# Patient Record
Sex: FEMALE | Employment: FULL TIME | URBAN - METROPOLITAN AREA
[De-identification: names, ages, dates, MRNs, and addresses within clinical notes are randomized per-mention and may not be internally consistent; named-entity substitution may affect disease eponyms.]

---

## 2022-05-16 ENCOUNTER — HOSPITAL ENCOUNTER (EMERGENCY)
Facility: CLINIC | Age: 37
Discharge: HOME | End: 2022-05-17
Attending: EMERGENCY MEDICINE | Admitting: EMERGENCY MEDICINE

## 2022-05-16 DIAGNOSIS — F10.920 ALCOHOLIC INTOXICATION WITHOUT COMPLICATION (H): ICD-10-CM

## 2022-05-16 DIAGNOSIS — R45.851 SUICIDAL IDEATION: ICD-10-CM

## 2022-05-16 PROCEDURE — 99285 EMERGENCY DEPT VISIT HI MDM: CPT | Mod: 25

## 2022-05-16 PROCEDURE — 90791 PSYCH DIAGNOSTIC EVALUATION: CPT

## 2022-05-17 VITALS
RESPIRATION RATE: 16 BRPM | SYSTOLIC BLOOD PRESSURE: 109 MMHG | OXYGEN SATURATION: 98 % | HEART RATE: 91 BPM | DIASTOLIC BLOOD PRESSURE: 78 MMHG | TEMPERATURE: 98.6 F

## 2022-05-17 PROCEDURE — 250N000013 HC RX MED GY IP 250 OP 250 PS 637: Performed by: EMERGENCY MEDICINE

## 2022-05-17 RX ORDER — ACETAMINOPHEN 325 MG/1
650 TABLET ORAL ONCE
Status: COMPLETED | OUTPATIENT
Start: 2022-05-17 | End: 2022-05-17

## 2022-05-17 RX ADMIN — ACETAMINOPHEN 650 MG: 325 TABLET, FILM COATED ORAL at 01:30

## 2022-05-17 NOTE — ED NOTES
Pt resting in bed. Brought patient water and breakfast. Pt remains calm, cooperative. Call light within reach.
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
## 2022-05-17 NOTE — ED NOTES
Signed out to me patient signed out to me by Dr. Killian.  Please see us or provider note for full details.  Patient arrived via EMS on LONNIE and intoxicated.  Reportedly wrote a suicide letter.  DEC initially tried to evaluate the patient was too intoxicated.  Plan is for sober reassessment.    Patient reassessed in the morning.  She is lucid and able to ambulate without any assistance.  No slurring of speech.  Patient is clinically sober.  She admits to drinking alcohol last night denies any other drug use.  She reports having previous suicide attempt in remote past but is no longer suicidal.  She denies any hallucinations or homicidal thoughts.  Patient notified that DEC will be evaluating her and she understands and agrees.    I spoke with DEC who felt patient was safe to discharge and I agree.  Patient able to contract for safety.  Outpatient resources provided.  All questions answered prior to discharge.      Alexander Hurst MD  Emergency Medicine      Natali, Alexander Hernández MD  05/17/22 9611
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
## 2022-05-17 NOTE — ED NOTES
Patient is cleard for discharge home. Safety plan reviewed with patient. Patient able to contract for safety. Patient verbalized understanding of discharge instructions including follow up appointments. All belongings returned to patient.

## 2022-05-17 NOTE — DISCHARGE INSTRUCTIONS
As discussed, we have scheduled you for therapy and psychiatry. Below are the appointment details:     Teletherapy: 5/22/2022 6:00 pm  Alexa Krishnamurthy MA  FT  [address] [address], [address] [phone]     Psychiatry: 6/9/2022 3:00 pm  Charlette Ireland  MS  [place], [place]  [address] [facility] , [address]  [phone]    Aftercare Plan  If I am feeling unsafe or I am in a crisis, I will:   Contact my established care providers   Call the [place] Suicide Prevention Lifeline: [phone]   Go to the nearest emergency room   Call [phone]     Warning signs that I or other people might notice when a crisis is developing for me:   Thoughts to harm myself and urges/plans to act on those thoughts    Things I am able to do on my own or with others to cope or help me feel better:   Journal  Talk and spend time with friends     Things I can use or do for distraction:   Try TIPP! It is a set of techniques that can help when you feel overwhelmed with an emotion. Practice these techniques so you know what they feel like and then use them as needed.     T: Temperature  Cooler temperatures decrease your heart rate (which is usually faster when we are emotionally overwhelmed). You can either splash your face with cold water, take a cold (but not too cold) shower, or if the weather outside is chilly you can go outside for a walk. Another idea is to take an ice cube and hold it in your hand or rub your face with it.    Higher temperatures increase your heart rate (which is usually lower when you feel depressed, sad, or anxious). You can take a hot bath, nestle up in a blanket, go outside on a hot day, or drinking a warm tea.    I: Intense Exercise  When you have a built-up energy as a result of experiencing overwhelming emotions, it can be a really good idea to spend this energy by doing a cardio work-out. It doesn't have to be anything fancy - you don't need special equipment or expensive membership in a  gym. Simply get on your feet and do one of the following: go for a run around the block, do jumping jacks in your room, go outside and walk fast. You can also try jumping rope, dancing or lifting weights. Do this for 10-15 minutes but don't overdo it. When you spend that conserved energy you will feel more tired and your overwhelming emotions will become more balanced.    P: Paced Breathing  In order to reduce the physical manifestation of the overwhelming emotions you feel (for e.g. increased heart rate, flushed face, dry mouth, sweating etc.) it helps to try to control your breathing so that its rate will eventually decrease. Try the following technique: breathe in deeply through your nose (abdominal breathing) for four seconds and then breathe out through your mouth (for six seconds). Do this for 1-2 minutes.    P: Progressive Muscle Relaxation  In order to relax the tense muscles in our body while we are experiencing extreme emotions, you can try progressive muscle relaxation. You can do this from a seated position. Start with the top of your body - become aware of your muscles and the upper back and deliberately tighten them for five seconds. Then let go - you should feel the region loosening up. Keep doing this with your arms, your abdominal and back muscles, your bottom muscles, thighs and upper legs and calves. This is a great way for your body to let go of the excessive energy that has built up with the overwhelming emotions.    Changes I can make to support my mental health and wellness:   Start with seeing a therapist as discussed; finding the right fit is important. It may take several appointments to see how you connect with them. Finding a therapist who is a good fit is important.    Consider medications that can help manage your anxiety and depression.     Practice self-care: eat regularly, drink plenty of water daily, minimize use of alcohol and other substances, try to spend at least 30 minutes  "outside each day.    Your [facility] has a mental health crisis team you can call 24/7: [facility] Crisis  [phone]    Additional resources and information:    Crisis Lines  Crisis Text Line  Text 787758  You will be connected with a trained live crisis counselor to provide support.    National Hope Line  1.800.SUICIDE [0419340]      Community Resources  Fast Tracker  Linking people to mental health and substance use disorder resources  Youlicitn.org     [place] Mental Health Warm Line  Peer to peer support  Monday thru Saturday, 12 pm to 10 pm  316.965.8935 or 3.889.549.2763  Text \"Support\" to 63698    National [place] on Mental Illness (ROSA)  823.860.4914 or 1.888.ROSA.HELPS      Mental Health Apps  My3  https://my3app.org/    VirtualHopeBox  https://[place].org/apps/virtual-hope-box/  "
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
## 2022-10-26 ENCOUNTER — APPOINTMENT (OUTPATIENT)
Dept: CT IMAGING | Facility: CLINIC | Age: 37
End: 2022-10-26
Attending: EMERGENCY MEDICINE

## 2022-10-26 ENCOUNTER — APPOINTMENT (OUTPATIENT)
Dept: MRI IMAGING | Facility: CLINIC | Age: 37
End: 2022-10-26
Attending: PHYSICIAN ASSISTANT

## 2022-10-26 ENCOUNTER — HOSPITAL ENCOUNTER (OUTPATIENT)
Facility: CLINIC | Age: 37
Setting detail: OBSERVATION
Discharge: HOME | End: 2022-10-27
Attending: EMERGENCY MEDICINE | Admitting: HOSPITALIST

## 2022-10-26 DIAGNOSIS — M62.81 LEFT-SIDED MUSCLE WEAKNESS: ICD-10-CM

## 2022-10-26 DIAGNOSIS — G43.809 MIGRAINE VARIANT: Primary | ICD-10-CM

## 2022-10-26 LAB
ANION GAP SERPL CALCULATED.3IONS-SCNC: 15 MMOL/L (ref 7–15)
APTT PPP: 27 SECONDS (ref 22–38)
ATRIAL RATE - MUSE: 80 BPM
BASOPHILS # BLD AUTO: 0.1 10E3/UL (ref 0–0.2)
BASOPHILS NFR BLD AUTO: 1 %
BUN SERPL-MCNC: 9.6 MG/DL (ref 6–20)
CALCIUM SERPL-MCNC: 9.4 MG/DL (ref 8.6–10)
CHLORIDE SERPL-SCNC: 98 MMOL/L (ref 98–107)
CREAT SERPL-MCNC: 0.6 MG/DL (ref 0.51–0.95)
DEPRECATED HCO3 PLAS-SCNC: 22 MMOL/L (ref 22–29)
DIASTOLIC BLOOD PRESSURE - MUSE: NORMAL MMHG
EOSINOPHIL # BLD AUTO: 0.2 10E3/UL (ref 0–0.7)
EOSINOPHIL NFR BLD AUTO: 2 %
ERYTHROCYTE [DISTWIDTH] IN BLOOD BY AUTOMATED COUNT: 14.3 % (ref 10–15)
GFR SERPL CREATININE-BSD FRML MDRD: >90 ML/MIN/1.73M2
GLUCOSE BLDC GLUCOMTR-MCNC: 118 MG/DL (ref 70–99)
GLUCOSE SERPL-MCNC: 102 MG/DL (ref 70–99)
HCG SERPL QL: NEGATIVE
HCT VFR BLD AUTO: 44.5 % (ref 35–47)
HGB BLD-MCNC: 14.9 G/DL (ref 11.7–15.7)
IMM GRANULOCYTES # BLD: 0 10E3/UL
IMM GRANULOCYTES NFR BLD: 0 %
INR PPP: 1.03 (ref 0.85–1.15)
INTERPRETATION ECG - MUSE: NORMAL
LYMPHOCYTES # BLD AUTO: 2.5 10E3/UL (ref 0.8–5.3)
LYMPHOCYTES NFR BLD AUTO: 23 %
MCH RBC QN AUTO: 28.8 PG (ref 26.5–33)
MCHC RBC AUTO-ENTMCNC: 33.5 G/DL (ref 31.5–36.5)
MCV RBC AUTO: 86 FL (ref 78–100)
MONOCYTES # BLD AUTO: 0.6 10E3/UL (ref 0–1.3)
MONOCYTES NFR BLD AUTO: 5 %
NEUTROPHILS # BLD AUTO: 7.9 10E3/UL (ref 1.6–8.3)
NEUTROPHILS NFR BLD AUTO: 69 %
NRBC # BLD AUTO: 0 10E3/UL
NRBC BLD AUTO-RTO: 0 /100
P AXIS - MUSE: 56 DEGREES
PLATELET # BLD AUTO: 389 10E3/UL (ref 150–450)
POTASSIUM SERPL-SCNC: 3.4 MMOL/L (ref 3.4–5.3)
PR INTERVAL - MUSE: 144 MS
QRS DURATION - MUSE: 90 MS
QT - MUSE: 408 MS
QTC - MUSE: 470 MS
R AXIS - MUSE: 26 DEGREES
RBC # BLD AUTO: 5.18 10E6/UL (ref 3.8–5.2)
SARS-COV-2 RNA RESP QL NAA+PROBE: NEGATIVE
SODIUM SERPL-SCNC: 135 MMOL/L (ref 136–145)
SYSTOLIC BLOOD PRESSURE - MUSE: NORMAL MMHG
T AXIS - MUSE: 28 DEGREES
TROPONIN T SERPL HS-MCNC: <6 NG/L
VENTRICULAR RATE- MUSE: 80 BPM
WBC # BLD AUTO: 11.3 10E3/UL (ref 4–11)

## 2022-10-26 PROCEDURE — 250N000011 HC RX IP 250 OP 636: Performed by: EMERGENCY MEDICINE

## 2022-10-26 PROCEDURE — C9803 HOPD COVID-19 SPEC COLLECT: HCPCS

## 2022-10-26 PROCEDURE — 85025 COMPLETE CBC W/AUTO DIFF WBC: CPT | Performed by: EMERGENCY MEDICINE

## 2022-10-26 PROCEDURE — 84703 CHORIONIC GONADOTROPIN ASSAY: CPT | Performed by: EMERGENCY MEDICINE

## 2022-10-26 PROCEDURE — 258N000003 HC RX IP 258 OP 636: Performed by: EMERGENCY MEDICINE

## 2022-10-26 PROCEDURE — 72141 MRI NECK SPINE W/O DYE: CPT

## 2022-10-26 PROCEDURE — 85610 PROTHROMBIN TIME: CPT | Performed by: EMERGENCY MEDICINE

## 2022-10-26 PROCEDURE — 250N000013 HC RX MED GY IP 250 OP 250 PS 637: Performed by: PHYSICIAN ASSISTANT

## 2022-10-26 PROCEDURE — 93005 ELECTROCARDIOGRAM TRACING: CPT

## 2022-10-26 PROCEDURE — 80048 BASIC METABOLIC PNL TOTAL CA: CPT | Performed by: EMERGENCY MEDICINE

## 2022-10-26 PROCEDURE — G0508 CRIT CARE TELEHEA CONSULT 60: HCPCS | Mod: G0 | Performed by: PHYSICIAN ASSISTANT

## 2022-10-26 PROCEDURE — 96360 HYDRATION IV INFUSION INIT: CPT

## 2022-10-26 PROCEDURE — 70498 CT ANGIOGRAPHY NECK: CPT

## 2022-10-26 PROCEDURE — G0378 HOSPITAL OBSERVATION PER HR: HCPCS

## 2022-10-26 PROCEDURE — 36415 COLL VENOUS BLD VENIPUNCTURE: CPT | Performed by: EMERGENCY MEDICINE

## 2022-10-26 PROCEDURE — 70450 CT HEAD/BRAIN W/O DYE: CPT

## 2022-10-26 PROCEDURE — 70496 CT ANGIOGRAPHY HEAD: CPT

## 2022-10-26 PROCEDURE — 70551 MRI BRAIN STEM W/O DYE: CPT

## 2022-10-26 PROCEDURE — 84484 ASSAY OF TROPONIN QUANT: CPT | Performed by: EMERGENCY MEDICINE

## 2022-10-26 PROCEDURE — 99291 CRITICAL CARE FIRST HOUR: CPT | Mod: 25

## 2022-10-26 PROCEDURE — 99220 PR INITIAL OBSERVATION CARE,LEVEL III: CPT | Performed by: HOSPITALIST

## 2022-10-26 PROCEDURE — 96361 HYDRATE IV INFUSION ADD-ON: CPT

## 2022-10-26 PROCEDURE — 85730 THROMBOPLASTIN TIME PARTIAL: CPT | Performed by: EMERGENCY MEDICINE

## 2022-10-26 PROCEDURE — 250N000013 HC RX MED GY IP 250 OP 250 PS 637: Performed by: HOSPITALIST

## 2022-10-26 PROCEDURE — U0005 INFEC AGEN DETEC AMPLI PROBE: HCPCS | Performed by: EMERGENCY MEDICINE

## 2022-10-26 RX ORDER — ACETAMINOPHEN 325 MG/1
650 TABLET ORAL EVERY 4 HOURS PRN
Status: DISCONTINUED | OUTPATIENT
Start: 2022-10-26 | End: 2022-10-27 | Stop reason: HOSPADM

## 2022-10-26 RX ORDER — IOPAMIDOL 755 MG/ML
500 INJECTION, SOLUTION INTRAVASCULAR ONCE
Status: COMPLETED | OUTPATIENT
Start: 2022-10-26 | End: 2022-10-26

## 2022-10-26 RX ORDER — ONDANSETRON 4 MG/1
4 TABLET, ORALLY DISINTEGRATING ORAL EVERY 6 HOURS PRN
Status: DISCONTINUED | OUTPATIENT
Start: 2022-10-26 | End: 2022-10-27 | Stop reason: HOSPADM

## 2022-10-26 RX ORDER — ONDANSETRON 2 MG/ML
4 INJECTION INTRAMUSCULAR; INTRAVENOUS EVERY 6 HOURS PRN
Status: DISCONTINUED | OUTPATIENT
Start: 2022-10-26 | End: 2022-10-27 | Stop reason: HOSPADM

## 2022-10-26 RX ADMIN — NICOTINE 1 PATCH: 7 PATCH, EXTENDED RELEASE TRANSDERMAL at 17:02

## 2022-10-26 RX ADMIN — SODIUM CHLORIDE 80 ML: 9 INJECTION, SOLUTION INTRAVENOUS at 11:15

## 2022-10-26 RX ADMIN — ACETAMINOPHEN 650 MG: 325 TABLET, FILM COATED ORAL at 17:02

## 2022-10-26 RX ADMIN — IOPAMIDOL 75 ML: 755 INJECTION, SOLUTION INTRAVENOUS at 11:15

## 2022-10-26 ASSESSMENT — ENCOUNTER SYMPTOMS
ABDOMINAL PAIN: 0
FACIAL ASYMMETRY: 1
HEADACHES: 1
WEAKNESS: 1
FEVER: 0
NUMBNESS: 1
SHORTNESS OF BREATH: 0

## 2022-10-26 ASSESSMENT — ACTIVITIES OF DAILY LIVING (ADL)
ADLS_ACUITY_SCORE: 35
ADLS_ACUITY_SCORE: 20
ADLS_ACUITY_SCORE: 35
ADLS_ACUITY_SCORE: 20

## 2022-10-26 NOTE — ED PROVIDER NOTES
History   Chief Complaint:  Headache     The history is provided by the patient.      Rolando Frias is a 37 year old female, otherwise healthy, who presents with sudden onset of left-sided headache, left eye drooping, and numbness, tingling, and weakness to her left arm and leg, all persisting since sudden onset 2 hours ago with last known well time just prior. The patient works as a  at [employer] and just finished a 12 hour overnight shift there last night, feeling well and asymptomatic throughout. A couple hours after her shift however, at 0900, the patient developed sudden onset of symptoms as noted above along with some mild left-sided neck pain, and persisting discomfort prompted her presentation to ED today for evaluation. She is otherwise well and denies any hearing changes, chest pain, abdominal pain, shortness of breath, urinary symptoms, or recent fever. She denies history of migraines or seizures along with other pertinent past medical problems.     Review of Systems   Constitutional: Negative for fever.   HENT: Negative for hearing loss.    Respiratory: Negative for shortness of breath.    Cardiovascular: Negative for chest pain.   Gastrointestinal: Negative for abdominal pain.   Genitourinary: Negative.    Neurological: Positive for facial asymmetry, weakness (left-sided), numbness (and tingling to left side) and headaches.   All other systems reviewed and are negative.    Allergies:  Ibuprofen    Medications:  The patient takes no daily medication.    Past Medical History:     Depression  Suicidal ideation  Alcohol intoxication    Social History:  The patient presents to the ED alone.  The patient arrived in a private vehicle.  PCP: No Ref-Primary, Physician     Physical Exam     Patient Vitals for the past 24 hrs:   BP Temp Pulse Resp SpO2 Weight   10/26/22 1135 (!) 144/88 -- 88 11 100 % --   10/26/22 1130 131/85 -- 85 10 -- --   10/26/22 1121 (!) 164/79 -- 69 -- -- --    10/26/22 1056 -- -- -- -- -- 58 kg (127 lb 13.9 oz)   10/26/22 1053 123/86 -- 83 16 96 % --   10/26/22 1052 -- 97.8  F (36.6  C) -- -- -- --     Physical Exam  General: Sitting on the ED chair, no distress  HEENT: Normocephalic, atraumatic  Cardiac: Radial pulses 2+, regular rate and rhythm  Pulm: Breathing comfortably, no accessory muscle usage, no conversational dyspnea, and lungs clear bilaterally  GI: Abdomen soft, nontender, no rigidity or guarding  MSK: No deformities  Skin: Warm and dry  Neuro: Left eye blurred vision, NIH stroke scale below  Psych: Normal mood and affect    National Institutes of Health Stroke Scale  Exam Interval: Baseline   Score    Level of consciousness: (0)   Alert, keenly responsive    LOC questions: (0)   Answers both questions correctly    LOC commands: (0)   Performs both tasks correctly    Best gaze: (0)   Normal    Visual: (0)   No visual loss    Facial palsy: (1)   Minor paralysis (flat nasolabial fold, smile asymmetry)    Motor arm (left): (0)   No drift    Motor arm (right): (0)   No drift    Motor leg (left):  Unable to evaluate sitting    Motor leg (right):  Unable to evaluate sitting    Limb ataxia: (1)   Present in one limb, LUE past-pointing    Sensory: (1)   Mild to moderate sensory loss    Best language: (0)   Normal- no aphasia    Dysarthria: (0)   Normal    Extinction and inattention: (0)   No abnormality        Total Score:  3        Emergency Department Course   ECG  ECG taken at 1141, ECG read at 1147  Normal sinus rhythm.  Normal ECG.  Rate 80 bpm. CO interval 144 ms. QRS duration 90 ms. QT/QTc 408/470 ms. P-R-T axis 56 26 28.    Imaging:  MR Brain w/o Contrast   Final Result   IMPRESSION:    Normal brain MRI. No acute intracranial finding. No evidence for   recent ischemia, intracranial hemorrhage, or mass.      SAGRARIO PAYAN MD            SYSTEM ID:  [id number]      CTA Head Neck w Contrast   Final Result   IMPRESSION:   HEAD CTA:   1.  Negative. No vessel  stenosis, occlusion or aneurysm.      NECK CTA:   1.  Negative. No dissection or hemodynamically significant narrowing   in the neck by NASCET criteria.      Findings were discussed with Dr. EMEKA COLLIER via telephone at 1130   hours on 10/26/2022.      SAGRARIO PAYAN MD            SYSTEM ID:  [id number]      CT Head w/o Contrast   Final Result   IMPRESSION:   No intracranial hemorrhage, mass, or definite CT evidence of recent   ischemia.      SAGRARIO PAYAN MD            SYSTEM ID:  [id number]        Report per radiology    Laboratory:  Labs Ordered and Resulted from Time of ED Arrival to Time of ED Departure   BASIC METABOLIC PANEL - Abnormal       Result Value    Sodium 135 (*)     Potassium 3.4      Chloride 98      Carbon Dioxide (CO2) 22      Anion Gap 15      Urea Nitrogen 9.6      Creatinine 0.60      Calcium 9.4      Glucose 102 (*)     GFR Estimate >90     CBC WITH PLATELETS AND DIFFERENTIAL - Abnormal    WBC Count 11.3 (*)     RBC Count 5.18      Hemoglobin 14.9      Hematocrit 44.5      MCV 86      MCH 28.8      MCHC 33.5      RDW 14.3      Platelet Count 389      % Neutrophils 69      % Lymphocytes 23      % Monocytes 5      % Eosinophils 2      % Basophils 1      % Immature Granulocytes 0      NRBCs per 100 WBC 0      Absolute Neutrophils 7.9      Absolute Lymphocytes 2.5      Absolute Monocytes 0.6      Absolute Eosinophils 0.2      Absolute Basophils 0.1      Absolute Immature Granulocytes 0.0      Absolute NRBCs 0.0     GLUCOSE BY METER - Abnormal    GLUCOSE BY METER POCT 118 (*)    INR - Normal    INR 1.03     PARTIAL THROMBOPLASTIN TIME - Normal    aPTT 27     TROPONIN T, HIGH SENSITIVITY - Normal    Troponin T, High Sensitivity <6     HCG QUALITATIVE PREGNANCY - Normal    hCG Serum Qualitative Negative     GLUCOSE MONITOR NURSING POCT   COVID-19 VIRUS (CORONAVIRUS) BY PCR     Emergency Department Course:      Reviewed:  I reviewed nursing notes, vitals, past medical history and Care  Everywhere.    Assessments:  1100 I obtained history and examined the patient as noted above.   1102 I called for Tier 1 code stroke.  1107    I consulted with stroke neurology regarding the patient's history and presentation here in the emergency department.  1131 I spoke with radiology.  1201 I spoke with stroke neurology.  1301 I rechecked the patient and explained findings. I discussed plan for admission and the patient is in agreement.     Consults:  1257 I consulted with Dr. Lua, hospitalist, regarding the patient's history and presentation here in the emergency department who accepted the patient for admission.    Disposition:  The patient was admitted to the hospital under the care of Dr. Lua.     Impression & Plan     Medical Decision Makin-year-old female presents with left-sided facial droop, neck pain, and extremity symptoms as above.  Time since onset on my initial evaluation is 3 hours, tier 1 code stroke called.  CT and CTA imaging are without acute abnormality.  MRI subsequently ordered in continuing consultation with neurology stroke team and is also negative. Differential at this time includes complex migraine, complex partial seizure.  TIA is seemingly less likely given the patient's ongoing symptoms and negative imaging results.  Neurology stroke recommending observation admission for further care, general neurology evaluation, and possible repeat MRI.      Diagnosis:    ICD-10-CM    1. Left-sided muscle weakness  M62.81         Scribe Disclosure:  I, Lucy Son, am serving as a scribe at 11:07 AM on 10/26/2022 to document services personally performed by Manav Salvador MD based on my observations and the provider's statements to me.    MD Modesto BERTRAND Colin, MD  10/26/22 2835

## 2022-10-26 NOTE — CONSULTS
[facility]    Stroke Consult Note    Reason for Consult: Stroke Code     Chief Complaint: Headache      HPI  Rolando Frias is a 37 year old right handed woman with no past medical history other than ongoing smoking who presents to the hospital with new symptoms that began this morning.    She worked overnight at her job.  She felt normal during that whole shift.  She was relaxing at home after her shift around 8 am and felt fine. At 0815, she noticed that her eyelid on the left felt funny, and also that she had some mild weakness and tingling of the left arm and leg. Also some heaviness and loss of sensation L head and neck.  She feels it takes more effort to move the L side    Her symptoms are relatively unchanged during her time in the ED. She is normotensive at 123/86    Imaging Findings  CT/CTA unremarkable  Hyperacute brain MRI done as part of the stroke code personally reviewed by me at 12:46 PM did not show any acute ischemia there was a delay in acquisition of the hyperacute brain MRI due to questionable things on the MRI safety checklist    Intravenous Thrombolysis  Not given due to: Nondisabling symptoms at present.  Negative hyperacute MRI.  - minor/isolated/quickly resolving symptoms  - stroke mimic: possibly other etiology such as migarine    Endovascular Treatment  Not initiated due to absence of proximal vessel occlusion    Impression   Mild L hemiparesis and numbness, unlikely to be due to stroke due to   1) young age  2) lack of vascular risk factors except for that she does smoke  3) lack of hypertensive response in ED  4) negative hyperacute brain MRI    Recommendations  -Consult general neurology for evaluation for stroke mimics such as migraine.  This syndrome is not consistent with TIA since her symptoms have been ongoing for so many hours and are still not resolved.  If her symptoms continue to persist tomorrow without any other explanationn, consideration  "should be given to doing a full brain MRI with and without contrast with perhaps coronal diffusion sequence as well as to evaluate for any other tiny infarct that could have been missed on the hyperacute brain MRI  -Continue neurochecks every 4 hours    Patient Follow-up     - final recommendation pending work-up    Thank you for this consult. No further stroke evaluation is recommended, so we will sign off. Please contact us with any additional questions.     I discussed the patient's case with my attending Dr. Eliezer Garcia PA-C  Vascular Neurology  10/26/2022 1:13 PM  To page me or covering stroke neurology team member, click here: [facility]   Choose \"On Call\" tab at top, then search dropdown box for \"Neurology Adult\", select location, press Enter, then look for stroke/neuro ICU/telestroke.    ______________________________________________________    Clinically Significant Risk Factors Present on Admission         # Hyponatremia: Lowest Na = 135 mmol/L (Ref range: 136-145) in last 2 days, will monitor as appropriate             Past Medical History   No past medical history on file.  Past Surgical History   No past surgical history on file.  Medications   Home Meds  Prior to Admission medications    Not on File       Scheduled Meds      Infusion Meds      PRN Meds      Allergies   Allergies   Allergen Reactions     Ibuprofen Hives     Family History   No family history on file.  Social History        Review of Systems   The 5 point Review of Systems is negative other than noted in the HPI or here.        PHYSICAL EXAMINATION  Temp:  [97.8  F (36.6  C)] 97.8  F (36.6  C)  Pulse:  [69-88] 88  Resp:  [10-16] 11  BP: (123-164)/(79-88) 144/88  SpO2:  [96 %-100 %] 100 %     General:  patient lying in bed without any acute distress    HEENT:  normocephalic/atraumatic  Pulmonary:  no respiratory distress    Neurologic  Mental Status:  alert, oriented x 3, follows commands, speech clear and fluent, naming " and repetition normal  Cranial Nerves:  visual fields intact (tested by nurse), EOMI with normal smooth pursuit with very mild L ptosis,, facial movements symmetric, hearing not formally tested but intact to conversation, no dysarthria, shoulder shrug equal bilaterally, tongue protrusion midline  Motor:  no abnormal movements, able to move all limbs antigravity spontaneously with no signs of hemiparesis observed, no pronator drift  Reflexes:  unable to test (telestroke)  Sensory:  light touch sensation intact throughout upper and lower extremities but decreased by 50% left face, arm, leg(assessed by nurse), no extinction on double simultaneous stimulation (assessed by nurse)  Coordination:  normal finger-to-nose and heel-to-shin bilaterally without dysmetria, rapid alternating movements slightly slower on the left  Station/Gait:  unable to test due to telestroke      Dysphagia Screen  Per Nursing    Stroke Scales    NIHSS  1a. Level of Consciousness 0-->Alert, keenly responsive   1b. LOC Questions 0-->Answers both questions correctly   1c. LOC Commands 0-->Performs both tasks correctly   2.   Best Gaze 0-->Normal   3.   Visual 0-->No visual loss   4.   Facial Palsy 0-->Normal symmetrical movements   5a. Motor Arm, Left 0-->No drift, limb holds 90 (or 45) degrees for full 10 secs   5b. Motor Arm, Right 0-->No drift, limb holds 90 (or 45) degrees for full 10 secs   6a. Motor Leg, Left 0-->No drift, leg holds 30 degree position for full 5 secs   6b. Motor Leg, right 0-->No drift, leg holds 30 degree position for full 5 secs   7.   Limb Ataxia 0-->Absent   8.   Sensory 1-->Mild-to-moderate sensory loss, patient feels pinprick is less sharp or is dull on the affected side, or there is a loss of superficial pain with pinprick, but patient is aware of being touched   9.   Best Language 0-->No aphasia, normal   10. Dysarthria 0-->Normal   11. Extinction and Inattention  0-->No abnormality   Total 1 (10/26/22 3655)        Modified Ap Score (Pre-morbid)  0-No deficits    Imaging  I personally reviewed all imaging; relevant findings per HPI.     Lab Results Data   CBC  Recent Labs   Lab 10/26/22  1104   WBC 11.3*   RBC 5.18   HGB 14.9   HCT 44.5        Basic Metabolic Panel    Recent Labs   Lab 10/26/22  1104 10/26/22  1101   *  --    POTASSIUM 3.4  --    CHLORIDE 98  --    CO2 22  --    BUN 9.6  --    CR 0.60  --    * 118*   NITIN 9.4  --      Liver Panel  No results for input(s): PROTTOTAL, ALBUMIN, BILITOTAL, ALKPHOS, AST, ALT, BILIDIRECT in the last 168 hours.  INR    Recent Labs   Lab Test 10/26/22  1104   INR 1.03      Lipid Profile  No lab results found.  A1C  No lab results found.  Troponin    Recent Labs   Lab 10/26/22  1104   CTROPT <6          Stroke Code Data Data   Stroke Code Data  (for stroke code with tele)  Stroke code activated 10/26/22   1105   First stroke provider response 10/26/22   1106   Video start time 10/26/22   1142   Video end time 10/26/22   1157   Last known normal 10/26/22   0814   Time of discovery  (or onset of symptoms)  10/26/22   0815   Head CT read by Stroke Neuro Dr/Provider 10/26/22   1123   Was stroke code de-escalated? Yes 10/26/22 1259           Telestroke Service Details  Type of service telemedicine diagnostic assessment of acute neurological changes   Reason telemedicine is appropriate patient requires assessment with a specialist for diagnosis and treatment of neurological symptoms   Mode of transmission secure interactive audio and video communication per Devin   Originating site (patient location) [facility]    Distant site (provider location) Provider remote site       I have personally spent a total of 90 minutes providing care today, time spent in reviewing medical records and reviewing tests, examining the patient and obtaining history, coordination of care, and discussion with the patient and/or family regarding diagnostic results,  prognosis, symptom management, risks and benefits of management options, and development of plan of care. Greater than 50% was spent in counseling and coordination of care.

## 2022-10-26 NOTE — H&P
[facility]    History and Physical - Hospitalist Service       Date of Admission:  10/26/2022    Assessment & Plan      Rolando Frias is a 37 year old healthy female admitted on 10/26/2022 with left sided stroke-like symptoms (L eye droop, decreased sensation, left arm/leg weakness, headache). Possibly complex migraine    Left sided stroke-like symptoms    - these included: left eye droop, left sided (face, arm, leg) numbness, left arm/leg weakness, left sided headache    - all are improving    - CT/CTA/MRI brain in the ED were all unrevealing    - stroke Neuro does not think her symptoms are related to stroke/TIA    - possibly due to complex migraine (although she does not have a history of headache/migraine)    - Gen Neuro consult    - Q4 hour neuro checks     - as per Neuro, if symptoms persist tomorrow, obtain full brain MRI with and without contrast with perhaps coronal diffusion sequence as well as to evaluate for any other tiny infarct that could have been missed on the hyperacute brain MRI    Smoker    - a few per day    - requesting patch    Full code: discussed with patient     Diet: Regular Diet Adult    DVT Prophylaxis: Low Risk/Ambulatory with no VTE prophylaxis indicated  Dunlap Catheter: Not present  Central Lines: None  Cardiac Monitoring: None  Code Status:   Full    Clinically Significant Risk Factors Present on Admission         # Hyponatremia: Lowest Na = 135 mmol/L (Ref range: 136-145) in last 2 days, will monitor as appropriate                      Disposition Plan      Expected Discharge Date: 10/27/2022                The patient's care was discussed with the Bedside Nurse, Patient and ED provider.    Tony Lua MD  Hospitalist Service  [facility]  Securely message with the Vocera Web Console (learn more here)  Text page via [employer] Paging/Directory   _____________________________________________________________________    Chief Complaint  "  Headache, left eye droop, left sided numbness, left arm/leg weakness    History is obtained from the patient    History of Present Illness   Rolando Frias is a 37 year old healthy female admitted on 10/26/2022 with left sided stroke-like symptoms (L eye droop, decreased sensation, left arm/leg weakness, headache).  Patient works overnights at the [email or web address].  She returned home this morning after a 12-hour shift and at around 8:30/ 9:00am noticed a left sided headache and a left thigh droop.  She also had decreased sensation of the left side of her face, her left arm, and her left leg.  She also had weakness of her left arm and leg.  She came to the ED and arrived here within an hour and a half.  Since that time, her symptoms have improved.  Her left eye droop is almost gone.  Her sensation is almost back to normal.  Her strength is almost back to normal as well.  She denies any chest pain, shortness of breath, abdominal pain, nausea, vomiting, diarrhea.  No cough, runny nose, sore throat.  No fevers or chills.  No urinary symptoms.  She states she does not have a history of headaches or migraines.  She has otherwise been well recently.  No history of strokes in her family.  She is a smoker.    Review of Systems    The 10 point Review of Systems is negative other than noted in the HPI or here.     Past Medical History    I have reviewed this patient's medical history and updated it with pertinent information if needed.   No medical history. Has 3 children    Past Surgical History   I have reviewed this patient's surgical history and updated it with pertinent information if needed.  none    Social History   I have reviewed this patient's social history and updated it with pertinent information if needed.     Smokes \"a few\" cigarettes per day. Drinks a white claw almost daily. No history of withdrawal  Family History     Mom and dad are both healthy. Has sisters, all healthy    Prior to Admission Medications "   None     Allergies   Allergies   Allergen Reactions     Ibuprofen Hives       Physical Exam   Vital Signs: Temp: 97.8  F (36.6  C)   BP: (!) 144/88 Pulse: 88   Resp: 11 SpO2: 100 %      Weight: 127 lbs 13.87 oz    Constitutional: awake, alert, cooperative, no apparent distress, and appears stated age  Eyes: Lids and lashes normal, pupils equal, round and reactive to light, extra ocular muscles intact, sclera clear, conjunctiva normal  ENT: Normocephalic, without obvious abnormality, atraumatic, sinuses nontender on palpation, external ears without lesions, oral pharynx with moist mucous membranes, tonsils without erythema or exudates, gums normal and good dentition.  Respiratory: No increased work of breathing, good air exchange, clear to auscultation bilaterally, no crackles or wheezing  Cardiovascular: Normal apical impulse, regular rate and rhythm, normal S1 and S2, no S3 or S4, and no murmur noted  GI: No scars, normal bowel sounds, soft, non-distended, non-tender, no masses palpated, no hepatosplenomegally  Skin: no bruising or bleeding  Musculoskeletal: no lower extremity pitting edema present  Neurologic: Awake, alert, oriented to name, place and time.  Cranial nerves II-XII are grossly intact.  Motor is 5 out of 5 bilaterally.  Cerebellar finger to nose, heel to shin intact.  Sensory is intact.  Babinski down going, Romberg negative, and gait is normal.  Motor Exam:  Strength 5/5/ all extrem  Sensory:  Decreased touch sensation on left side of face  Deep Tendon Reflexes: difficult to elicit patellar and UE reflexes  Plantar Response:  Right:  Downgoing, Left: difficult to elicit    Data   Data reviewed today: I reviewed all medications, new labs and imaging results over the last 24 hours. I personally reviewed the head CT image(s) showing no acute findings and the brain MRI image(s) showing no acute findings.  EKG: NSR, no st/t changes  Most Recent 3 CBC's:Recent Labs   Lab Test 10/26/22  1104   WBC 11.3*    HGB 14.9   MCV 86        Most Recent 3 BMP's:Recent Labs   Lab Test 10/26/22  1104 10/26/22  1101   *  --    POTASSIUM 3.4  --    CHLORIDE 98  --    CO2 22  --    BUN 9.6  --    CR 0.60  --    ANIONGAP 15  --    NITIN 9.4  --    * 118*     Most Recent 2 LFT's:No lab results found.  Recent Results (from the past 24 hour(s))   CT Head w/o Contrast    Narrative    CT HEAD W/O CONTRAST 10/26/2022 11:16 AM    INDICATION: Sudden onset left-sided headache. Left eye drooping,  numbness and tingling with left arm and leg weakness.  TECHNIQUE: CT scan of the head without contrast. Dose reduction  techniques were used.  CONTRAST: None.  COMPARISON: None    FINDINGS:   No intracranial hemorrhage, extraaxial collection, mass effect or CT  evidence of acute infarct.  Normal parenchymal density for age. The  ventricles and sulci are normal for age. Osseous structures are  intact. Unremarkable orbits. Paranasal sinuses are free of significant  disease. Clear mastoid air cells.      Impression    IMPRESSION:  No intracranial hemorrhage, mass, or definite CT evidence of recent  ischemia.    SAGRARIO PAYAN MD         SYSTEM ID:  [id number]   CTA Head Neck w Contrast    Narrative    CTA HEAD NECK W CONTRAST 10/26/2022 11:20 AM    INDICATION: Sudden onset left-sided headache. Left eye drooping,  numbness and tingling with left arm and leg weakness.  TECHNIQUE: Head and neck CT angiogram with IV contrast. CT images of  the head and neck vessels obtained during the arterial phase of  intravenous contrast administration. Axial helical 2D reconstructed  images and multiplanar 3D MIP reconstructed images of the head and  neck vessels were performed on a separate workstation. Dose reduction  techniques were used.  CONTRAST: 75mL [id number]  COMPARISON: None     FINDINGS:  HEAD CTA: The intracranial circulation is patent without evidence for  aneurysm, proximal vessel occlusion, high-grade intracranial stenosis  or  arteriovenous malformation.  Patent dural venous sinuses.    NECK CTA: Three vessel arch.  Carotid arteries are patent without  atherosclerotic change.  No hemodynamically significant stenosis by  NASCET criteria in either carotid system.  Patent vertebral arteries.  No dissection. 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 Mild  interspace degeneration above this at C5-C6.      Impression    IMPRESSION:  HEAD CTA:  1.  Negative. No vessel stenosis, occlusion or aneurysm.    NECK CTA:  1.  Negative. No dissection or hemodynamically significant narrowing  in the neck by NASCET criteria.    Findings were discussed with Dr. EMEKA COLLIER via telephone at 1130  hours on 10/26/2022.    SAGRARIO PAYAN MD         SYSTEM ID:  [id number]   MR Brain w/o Contrast    Narrative    MR BRAIN W/O CONTRAST 10/26/2022 12:52 PM    INDICATION: Headache; Neurologic deficit, non-traumatic; Visual  symptoms; Neurologic deficit onset <= 24 hours; No known/automatically  detected potential contraindications to iodinated contrast  TECHNIQUE: Noncontrast MRI of the brain.  CONTRAST: None  COMPARISON: Head and neck CTA 10/26/2022    FINDINGS:  There is no restricted diffusion. Mild mucosal thickening  within the ethmoid air cells. Paranasal sinuses are otherwise free  from significant disease. Mastoid air cells appear free from  significant disease. Intraorbital contents are unremarkable.  Ventricles are within normal limits in size for the patient's age.  Intracranial flow voids are intact. There is no mass effect, midline  shift, or extraaxial collection. Signal intensity of the brain  parenchyma is within normal limits for the patient's age. No evidence  for acute or chronic intracranial blood products.      Impression    IMPRESSION:   Normal brain MRI. No acute intracranial finding. No evidence for  recent ischemia, intracranial hemorrhage, or mass.    SAGRARIO MCDONNELL  MD ORA         SYSTEM ID:  [id number]

## 2022-10-26 NOTE — PHARMACY-ADMISSION MEDICATION HISTORY
Admission medication history interview status for this patient is complete. See [facility] admission navigator for allergy information, prior to admission medications and immunization status.     Prior to Admission medications    None
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
## 2022-10-26 NOTE — ED TRIAGE NOTES
Pt presents to ED with left droopy eye, intermittent headache to left side of head and dizziness. Also feels weakness to left arm and left leg. Pt reports the symptoms began around 9 am.

## 2022-10-26 NOTE — ED NOTES
[facility]  ED Nurse Handoff Report    Rolando Frias is a 37 year old female   ED Chief complaint: Headache  . ED Diagnosis:   Final diagnoses:   Left-sided muscle weakness     Allergies:   Allergies   Allergen Reactions     Ibuprofen Hives       Code Status: Full Code  Activity level - Baseline/Home:  Independent. Activity Level - Current:   Independent. Lift room needed: No. Bariatric: No   Needed: No   Isolation: No. Infection: Not Applicable.     Vital Signs:   Vitals:    10/26/22 1121 10/26/22 1130 10/26/22 1135 10/26/22 1500   BP: (!) 164/79 131/85 (!) 144/88 139/81   BP Location:    Left arm   Pulse: 69 85 88 81   Resp:  10 11 18   Temp:    97.7  F (36.5  C)   TempSrc:    Oral   SpO2:   100% 98%   Weight:           Cardiac Rhythm:  ,      Pain level:    Patient confused: No. Patient Falls Risk: No.   Elimination Status: has not needed to void.    Patient Report - Initial Complaint: Rolando Frias is a 37 year old female, otherwise healthy, who presents with sudden onset of left-sided headache, left eye drooping, and numbness, tingling, and weakness to her left arm and leg, all persisting since sudden onset 2 hours ago with last known well time just prior. The patient works as a  at [employer] and just finished a 12 hour overnight shift there last night, feeling well and asymptomatic throughout. A couple hours after her shift however, at 0900, the patient developed sudden onset of symptoms as noted above along with some mild left-sided neck pain, and persisting discomfort prompted her presentation to ED today for evaluation. She is otherwise well and denies any hearing changes, chest pain, abdominal pain, shortness of breath, urinary symptoms, or recent fever. She denies history of migraines or seizures along with other pertinent past medical problems. . Focused Assessment: see neuro flow sheet.    Tests Performed:   Abnormal Labs Resulted from Time of ED  Arrival to Time of ED Departure   BASIC METABOLIC PANEL - Abnormal       Result Value    Sodium 135 (*)     Potassium 3.4      Chloride 98      Carbon Dioxide (CO2) 22      Anion Gap 15      Urea Nitrogen 9.6      Creatinine 0.60      Calcium 9.4      Glucose 102 (*)     GFR Estimate >90     CBC WITH PLATELETS AND DIFFERENTIAL - Abnormal    WBC Count 11.3 (*)     RBC Count 5.18      Hemoglobin 14.9      Hematocrit 44.5      MCV 86      MCH 28.8      MCHC 33.5      RDW 14.3      Platelet Count 389      % Neutrophils 69      % Lymphocytes 23      % Monocytes 5      % Eosinophils 2      % Basophils 1      % Immature Granulocytes 0      NRBCs per 100 WBC 0      Absolute Neutrophils 7.9      Absolute Lymphocytes 2.5      Absolute Monocytes 0.6      Absolute Eosinophils 0.2      Absolute Basophils 0.1      Absolute Immature Granulocytes 0.0      Absolute NRBCs 0.0     GLUCOSE BY METER - Abnormal    GLUCOSE BY METER POCT 118 (*)       MR Brain w/o Contrast   Final Result   IMPRESSION:    Normal brain MRI. No acute intracranial finding. No evidence for   recent ischemia, intracranial hemorrhage, or mass.      SAGRARIO PAYAN MD            SYSTEM ID:  [id number]      CTA Head Neck w Contrast   Final Result   IMPRESSION:   HEAD CTA:   1.  Negative. No vessel stenosis, occlusion or aneurysm.      NECK CTA:   1.  Negative. No dissection or hemodynamically significant narrowing   in the neck by NASCET criteria.      Findings were discussed with Dr. EMEKA COLLIER via telephone at 1130   hours on 10/26/2022.      SAGRARIO PAYAN MD            SYSTEM ID:  [id number]      CT Head w/o Contrast   Final Result   IMPRESSION:   No intracranial hemorrhage, mass, or definite CT evidence of recent   ischemia.      SAGRARIO PAYAN MD            SYSTEM ID:  [id number]      MR Cervical Spine w/o Contrast    (Results Pending)     Treatments provided:   Family Comments:   OBS brochure/video discussed/provided to patient:  No  ED  Medications:   Medications   melatonin tablet 1 mg (has no administration in time range)   ondansetron (ZOFRAN ODT) ODT tab 4 mg (has no administration in time range)     Or   ondansetron (ZOFRAN) injection 4 mg (has no administration in time range)   nicotine Patch in Place (has no administration in time range)   nicotine (NICODERM CQ) 7 MG/24HR 24 hr patch 1 patch (has no administration in time range)   acetaminophen (TYLENOL) tablet 650 mg (has no administration in time range)   0.9% sodium chloride BOLUS (80 mLs Intravenous New Bag 10/26/22 1115)   iopamidol (ISOVUE-370) solution 500 mL (75 mLs Intravenous Given 10/26/22 1115)     Drips infusing:  No  For the majority of the shift, the patient's behavior Green. Interventions performed were .    Sepsis treatment initiated: No     Patient tested for COVID 19 prior to admission: YES    ED Nurse Name/Phone Number: Homero Szymanski RN,   4:48 PM   RECEIVING UNIT ED HANDOFF REVIEW    Above ED Nurse Handoff Report was reviewed: Yes  Reviewed by: Moody Smith RN on October 26, 2022 at 8:06 PM

## 2022-10-27 VITALS
BODY MASS INDEX: 23.15 KG/M2 | SYSTOLIC BLOOD PRESSURE: 116 MMHG | HEART RATE: 77 BPM | TEMPERATURE: 98.4 F | HEIGHT: 64 IN | RESPIRATION RATE: 16 BRPM | DIASTOLIC BLOOD PRESSURE: 77 MMHG | OXYGEN SATURATION: 97 % | WEIGHT: 135.6 LBS

## 2022-10-27 LAB
ANION GAP SERPL CALCULATED.3IONS-SCNC: 9 MMOL/L (ref 7–15)
BUN SERPL-MCNC: 18.8 MG/DL (ref 6–20)
CALCIUM SERPL-MCNC: 9 MG/DL (ref 8.6–10)
CHLORIDE SERPL-SCNC: 104 MMOL/L (ref 98–107)
CREAT SERPL-MCNC: 0.76 MG/DL (ref 0.51–0.95)
DEPRECATED HCO3 PLAS-SCNC: 23 MMOL/L (ref 22–29)
GFR SERPL CREATININE-BSD FRML MDRD: >90 ML/MIN/1.73M2
GLUCOSE SERPL-MCNC: 101 MG/DL (ref 70–99)
POTASSIUM SERPL-SCNC: 4 MMOL/L (ref 3.4–5.3)
SODIUM SERPL-SCNC: 136 MMOL/L (ref 136–145)

## 2022-10-27 PROCEDURE — 250N000013 HC RX MED GY IP 250 OP 250 PS 637: Performed by: HOSPITALIST

## 2022-10-27 PROCEDURE — 36415 COLL VENOUS BLD VENIPUNCTURE: CPT | Performed by: HOSPITALIST

## 2022-10-27 PROCEDURE — G0378 HOSPITAL OBSERVATION PER HR: HCPCS

## 2022-10-27 PROCEDURE — 80048 BASIC METABOLIC PNL TOTAL CA: CPT | Performed by: HOSPITALIST

## 2022-10-27 PROCEDURE — 99217 PR OBSERVATION CARE DISCHARGE: CPT | Performed by: HOSPITALIST

## 2022-10-27 RX ADMIN — NICOTINE 1 PATCH: 7 PATCH, EXTENDED RELEASE TRANSDERMAL at 08:36

## 2022-10-27 ASSESSMENT — ACTIVITIES OF DAILY LIVING (ADL)
ADLS_ACUITY_SCORE: 20

## 2022-10-27 NOTE — PLAN OF CARE
"Patient's After Visit Summary was reviewed with patient.   Patient verbalized understanding of After Visit Summary, recommended follow up and was given an opportunity to ask questions.   Discharge medications sent home with patient/family: no new medications   Discharged with nurse with all belongings. Family will transport home. All symptoms resolved.   /77 (BP Location: Right arm)   Pulse 77   Temp 98.4  F (36.9  C) (Oral)   Resp 16   Ht 1.626 m (5' 4\")   Wt 61.5 kg (135 lb 9.6 oz)   LMP  (Within Days)   SpO2 97%   BMI 23.28 kg/m    OBSERVATION patient END time: 0955        "

## 2022-10-27 NOTE — PLAN OF CARE
PRIMARY DIAGNOSIS: left-sided muscle weakness  OUTPATIENT/OBSERVATION GOALS TO BE MET BEFORE DISCHARGE:  1. Pain Status: Pain free.    2. Return to near baseline physical activity: Yes    3. Cleared for discharge by consultants (if involved): N/A    Discharge Planner Nurse   Safe discharge environment identified: Yes  Barriers to discharge: Yes       Entered by: Malissa Cornelius RN 10/27/2022 4:16 AM     Please review provider order for any additional goals.   Nurse to notify provider when observation goals have been met and patient is ready for discharge.Goal Outcome Evaluation:       A&Ox4,Pt denies any pain or discomfort, VSS. Pt is independent in room and abl to transfer and care. Pt is on tele monitoring SR 62. Pt consult neurologist am.  Pt is resting now, no distress noted. Will continue to monitor.

## 2022-10-27 NOTE — PLAN OF CARE
PRIMARY DIAGNOSIS: left-sided muscle weakness  OUTPATIENT/OBSERVATION GOALS TO BE MET BEFORE DISCHARGE:  1. Pain Status: Pain free.    2. Return to near baseline physical activity: Yes    3. Cleared for discharge by consultants (if involved): N/A    Discharge Planner Nurse   Safe discharge environment identified: Yes  Barriers to discharge: Yes       Entered by: Malissa Cornelius RN 10/27/2022 3:43 AM     Please review provider order for any additional goals.   Nurse to notify provider when observation goals have been met and patient is ready for discharge.Goal Outcome Evaluation:       A&Ox4,Pt denies any pain or discomfort, VSS. Pt is independent in room and abl to transfer and care. Pt is on tele monitoring. Pt is resting now. Will continue to monitor.

## 2022-10-27 NOTE — PLAN OF CARE
"PRIMARY DIAGNOSIS: Left-sided muscle weakness   OUTPATIENT/OBSERVATION GOALS TO BE MET BEFORE DISCHARGE:  1. ADLs back to baseline: No    2. Activity and level of assistance: Ambulating independently.    3. Pain status: Pain free.    4. Return to near baseline physical activity: Yes     Discharge Planner Nurse   Safe discharge environment identified: Yes  Barriers to discharge: Yes       Entered by: Moody Smith RN 10/26/2022 10:17 PM    /66 (BP Location: Left arm)   Pulse 90   Temp 98.3  F (36.8  C) (Oral)   Resp 18   Ht 1.626 m (5' 4\")   Wt 61.5 kg (135 lb 9.6 oz)   LMP  (Within Days)   SpO2 97%   BMI 23.28 kg/m    Pt is alert and oriented x4. Pt denies pain or discomfort. VSS. No acute distress noted. Pt is independent in transfer and cares. Pt is out for MRI. Pt is on Tele monitoring SR. Pt oriented to room, call light and meal times. Pt is on regular diet. Skin intact. Will continue to monitor and assess pt  Please review provider order for any additional goals.   Nurse to notify provider when observation goals have been met and patient is ready for discharge.  "

## 2022-10-27 NOTE — DISCHARGE SUMMARY
[facility]  Hospitalist Discharge Summary      Date of Admission:  10/26/2022  Date of Discharge:  10/27/2022  Discharging Provider: Tony Lua MD  Discharge Service: Hospitalist Service    Discharge Diagnoses   Left sided stroke-like symptoms. Possible complex migraine    Follow-ups Needed After Discharge   Follow-up Appointments     Follow-up and recommended labs and tests       Follow up with primary care provider, please establish care with a   primary care provider, within 7 days for hospital follow- up.  No follow   up labs or test are needed.  Follow-up with Neurology ([facility], [phone]).   A referral has been sent.           Unresulted Labs Ordered in the Past 30 Days of this Admission     No orders found for last 31 day(s).      These results will be followed up by NA    Discharge Disposition   Discharged to home  Condition at discharge: Stable    Hospital Course   Rolando Frias is a 37 year old healthy female admitted on 10/26/2022 with left sided stroke-like symptoms (L eye droop, decreased sensation, left arm/leg weakness, headache).  Patient works overnights at the [employer]  She returned home this morning after a 12-hour shift and at around 8:30/ 9:00am noticed a left sided headache and a left thigh droop.  She also had decreased sensation of the left side of her face, her left arm, and her left leg.  She also had weakness of her left arm and leg.  She came to the ED and arrived here within an hour and a half.  Since that time, her symptoms have improved.  Her left eye droop is almost gone.  Her sensation is almost back to normal.  Her strength is almost back to normal as well.  She denies any chest pain, shortness of breath, abdominal pain, nausea, vomiting, diarrhea.  No cough, runny nose, sore throat.  No fevers or chills.  No urinary symptoms.  She states she does not have a history of headaches or migraines.  She has otherwise been  well recently.  No history of strokes in her family.  She is a smoker.     Screening for the patient was seen by general neurology.  C-spine MRI was done.  This does not show any severe disease.  Stroke neurology has already signed off and did not feel like this was a stroke of any kind.  Her symptoms have all resolved.  She is at her baseline.  She will discharge home.  I have placed a referral for general neurology to follow-up.  I offered to call family.  She declined.  I educated her on smoking cessation.    Consultations This Hospital Stay   NEUROLOGY IP CONSULT    Code Status   Full Code    Time Spent on this Encounter   I, Tony Lua MD, personally saw the patient today and spent greater than 30 minutes discharging this patient.       Tony Lua MD  [facility] OBSERVATION DEPT  [address] [place] [address]  Phone: [phone]  ______________________________________________________________________    Physical Exam   Vital Signs: Temp: 98.4  F (36.9  C) Temp src: Oral BP: 116/77 Pulse: 77   Resp: 16 SpO2: 97 % O2 Device: None (Room air)    Weight: 135 lbs 9.6 oz  Constitutional: awake, alert, cooperative, no apparent distress, and appears stated age  Eyes: Lids and lashes normal, pupils equal, round and reactive to light, extra ocular muscles intact, sclera clear, conjunctiva normal  ENT: Normocephalic, without obvious abnormality, atraumatic, sinuses nontender on palpation, external ears without lesions, oral pharynx with moist mucous membranes, tonsils without erythema or exudates, gums normal and good dentition.  Respiratory: No increased work of breathing, good air exchange, clear to auscultation bilaterally, no crackles or wheezing  Cardiovascular: Normal apical impulse, regular rate and rhythm, normal S1 and S2, no S3 or S4, and no murmur noted  GI: No scars, normal bowel sounds, soft, non-distended, non-tender, no masses palpated, no hepatosplenomegally        Primary Care Physician   Physician No Ref-Primary    Discharge Orders      Adult Neurology  Referral      Reason for your hospital stay    Left sided numbness, weakness, eye droop     Follow-up and recommended labs and tests     Follow up with primary care provider, please establish care with a primary care provider, within 7 days for hospital follow- up.  No follow up labs or test are needed.  Follow-up with Neurology ([facility], [phone]). A referral has been sent.     Activity    Your activity upon discharge: activity as tolerated     Diet    Follow this diet upon discharge: Orders Placed This Encounter      Regular Diet Adult       Significant Results and Procedures   Most Recent 3 CBC's:Recent Labs   Lab Test 10/26/22  1104   WBC 11.3*   HGB 14.9   MCV 86        Most Recent 3 BMP's:Recent Labs   Lab Test 10/27/22  0512 10/26/22  1104 10/26/22  1101    135*  --    POTASSIUM 4.0 3.4  --    CHLORIDE 104 98  --    CO2 23 22  --    BUN 18.8 9.6  --    CR 0.76 0.60  --    ANIONGAP 9 15  --    NITIN 9.0 9.4  --    * 102* 118*     Most Recent 2 LFT's:No lab results found.,   Results for orders placed or performed during the hospital encounter of 10/26/22   CT Head w/o Contrast    Narrative    CT HEAD W/O CONTRAST 10/26/2022 11:16 AM    INDICATION: Sudden onset left-sided headache. Left eye drooping,  numbness and tingling with left arm and leg weakness.  TECHNIQUE: CT scan of the head without contrast. Dose reduction  techniques were used.  CONTRAST: None.  COMPARISON: None    FINDINGS:   No intracranial hemorrhage, extraaxial collection, mass effect or CT  evidence of acute infarct.  Normal parenchymal density for age. The  ventricles and sulci are normal for age. Osseous structures are  intact. Unremarkable orbits. Paranasal sinuses are free of significant  disease. Clear mastoid air cells.      Impression    IMPRESSION:  No intracranial hemorrhage, mass, or  definite CT evidence of recent  ischemia.    SAGRARIO PAYAN MD         SYSTEM ID:  [id number]   CTA Head Neck w Contrast    Narrative    CTA HEAD NECK W CONTRAST 10/26/2022 11:20 AM    INDICATION: Sudden onset left-sided headache. Left eye drooping,  numbness and tingling with left arm and leg weakness.  TECHNIQUE: Head and neck CT angiogram with IV contrast. CT images of  the head and neck vessels obtained during the arterial phase of  intravenous contrast administration. Axial helical 2D reconstructed  images and multiplanar 3D MIP reconstructed images of the head and  neck vessels were performed on a separate workstation. Dose reduction  techniques were used.  CONTRAST: 75mL Isovue-370  COMPARISON: None     FINDINGS:  HEAD CTA: The intracranial circulation is patent without evidence for  aneurysm, proximal vessel occlusion, high-grade intracranial stenosis  or arteriovenous malformation.  Patent dural venous sinuses.    NECK CTA: Three vessel arch.  Carotid arteries are patent without  atherosclerotic change.  No hemodynamically significant stenosis by  NASCET criteria in either carotid system.  Patent vertebral arteries.  No dissection. 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 Mild  interspace degeneration above this at C5-C6.      Impression    IMPRESSION:  HEAD CTA:  1.  Negative. No vessel stenosis, occlusion or aneurysm.    NECK CTA:  1.  Negative. No dissection or hemodynamically significant narrowing  in the neck by NASCET criteria.    Findings were discussed with Dr. EMEKA COLLIER via telephone at 1130  hours on 10/26/2022.    SAGRARIO PAYAN MD         SYSTEM ID:  [id number]   MR Brain w/o Contrast    Narrative    MR BRAIN W/O CONTRAST 10/26/2022 12:52 PM    INDICATION: Headache; Neurologic deficit, non-traumatic; Visual  symptoms; Neurologic deficit onset <= 24 hours; No known/automatically  detected potential contraindications  to iodinated contrast  TECHNIQUE: Noncontrast MRI of the brain.  CONTRAST: None  COMPARISON: Head and neck CTA 10/26/2022    FINDINGS:  There is no restricted diffusion. Mild mucosal thickening  within the ethmoid air cells. Paranasal sinuses are otherwise free  from significant disease. Mastoid air cells appear free from  significant disease. Intraorbital contents are unremarkable.  Ventricles are within normal limits in size for the patient's age.  Intracranial flow voids are intact. There is no mass effect, midline  shift, or extraaxial collection. Signal intensity of the brain  parenchyma is within normal limits for the patient's age. No evidence  for acute or chronic intracranial blood products.      Impression    IMPRESSION:   Normal brain MRI. No acute intracranial finding. No evidence for  recent ischemia, intracranial hemorrhage, or mass.    SAGRARIO PAYAN MD         SYSTEM ID:  [id number]   MR Cervical Spine w/o Contrast    Narrative    EXAM: MR CERVICAL SPINE W/O CONTRAST  LOCATION: [facility]  DATE/TIME: 10/26/2022 10:03 PM    INDICATION: Headache; Neurologic deficit, non traumatic; Weakness; Neurologic deficit onset <= 24 hours; No known automatically detected potential contraindications to iodinated contrast  COMPARISON: None.  TECHNIQUE: MRI Cervical Spine without IV contrast.    FINDINGS:   Normal vertebral body heights. Straightening of cervical lordosis. No abnormal cord signal. No extraspinal abnormality.    Craniovertebral junction and C1-C2: Normal.    C2-C3: Normal disc height. No herniation. Normal facets. No spinal canal or neural foraminal stenosis.     C3-C4: Normal disc height. Mild diffuse disc aspect complex, uncovertebral joint hypertrophy and facet arthropathy. Mild canal narrowing. Mild right foraminal narrowing.     C4-C5: Normal disc height. Moderate diffuse disc aspect complex more prominent centrally with slight mass effect on ventral aspect of the cord.  Mild uncovertebral joint hypertrophy and facet arthropathy. Moderate canal narrowing. Mild left foraminal   narrowing.    C5-C6: Mild narrowing of intervertebral space. Moderate diffuse disc aspect complex, uncovertebral joint hypertrophy and facet arthropathy. Mild canal narrowing. Moderate bilateral foraminal narrowing.     C6-C7: Partial fusion. Mild facet arthropathy. No significant canal or foraminal narrowing.     C7-T1: Normal disc height. No herniation. Normal facets. No spinal canal or neural foraminal stenosis.      Impression    IMPRESSION:  1.  Normal cervical and upper thoracic cord.  2.  Moderate degenerative changes with moderate canal narrowing at C4-C5, mild at C3-C4 and C5-C6.  3.  Multilevel neural foraminal narrowing, as described.           Discharge Medications   There are no discharge medications for this patient.    Allergies   Allergies   Allergen Reactions     Ibuprofen Hives

## 2022-10-27 NOTE — PLAN OF CARE
"Patient is alert and oriented x4. VS WNL and documented on the FS. Lung sounds clear and patient is on RA. Denies SOB. Active bowel sounds. Patient voiding spontaneously. Denied pain. All neuro symptoms have resolved and patient is back at her baseline. IV removed this morning. Tolerating diet. Independent in room. Plan: Discharge this morning.     /77 (BP Location: Right arm)   Pulse 77   Temp 98.4  F (36.9  C) (Oral)   Resp 16   Ht 1.626 m (5' 4\")   Wt 61.5 kg (135 lb 9.6 oz)   LMP  (Within Days)   SpO2 97%   BMI 23.28 kg/m        "

## 2022-10-27 NOTE — PLAN OF CARE
ROOM # 202.2    Living Situation (if not independent, order SW consult): Lives at home with family  Facility name:  : [facility]    Activity level at baseline: Independent  Activity level on admit: *Independent    Who will be transporting you at discharge:   [facility]        Patient registered to observation; given Patient Bill of Rights; given the opportunity to ask questions about observation status and their plan of care.  Patient has been oriented to the observation room, bathroom and call light is in place.    Discussed discharge goals and expectations with patient/family.

## 2022-10-27 NOTE — PLAN OF CARE
/81 (BP Location: Left arm)   Pulse 81   Temp 97.7  F (36.5  C) (Oral)   Resp 18   Wt 58 kg (127 lb 13.9 oz)   LMP  (Within Days)   SpO2 98%   Neuro: a/o x4  Cardiac: tele  Lungs: WNL  GI: WNL  : WNL  Pain: 6/10- tylenol given with some relief  IV: saline locked  Labs/tests: neck MRI- narrowing of neck  Diet: reg  Activity:  independent   Misc: q4 neuro checks  Plan: Will continue to monitor and provide cares.

## 2023-10-22 ENCOUNTER — HEALTH MAINTENANCE LETTER (OUTPATIENT)
Age: 38
End: 2023-10-22

## 2024-01-07 ENCOUNTER — OFFICE VISIT (OUTPATIENT)
Dept: URGENT CARE | Facility: URGENT CARE | Age: 39
End: 2024-01-07
Payer: COMMERCIAL

## 2024-01-07 VITALS
RESPIRATION RATE: 18 BRPM | OXYGEN SATURATION: 98 % | HEART RATE: 90 BPM | DIASTOLIC BLOOD PRESSURE: 74 MMHG | WEIGHT: 135 LBS | SYSTOLIC BLOOD PRESSURE: 116 MMHG | TEMPERATURE: 98.5 F | HEIGHT: 64 IN | BODY MASS INDEX: 23.05 KG/M2

## 2024-01-07 DIAGNOSIS — R05.3 PERSISTENT COUGH FOR 3 WEEKS OR LONGER: Primary | ICD-10-CM

## 2024-01-07 PROCEDURE — 99203 OFFICE O/P NEW LOW 30 MIN: CPT | Performed by: PHYSICIAN ASSISTANT

## 2024-01-07 RX ORDER — BENZONATATE 200 MG/1
200 CAPSULE ORAL 3 TIMES DAILY PRN
Qty: 30 CAPSULE | Refills: 1 | Status: SHIPPED | OUTPATIENT
Start: 2024-01-07 | End: 2024-08-07

## 2024-01-07 RX ORDER — AZITHROMYCIN 250 MG/1
TABLET, FILM COATED ORAL
Qty: 6 TABLET | Refills: 0 | Status: SHIPPED | OUTPATIENT
Start: 2024-01-07 | End: 2024-01-12

## 2024-01-07 RX ORDER — CETIRIZINE HYDROCHLORIDE 10 MG/1
20 TABLET ORAL DAILY
COMMUNITY
End: 2024-08-07

## 2024-01-07 RX ORDER — PREDNISONE 20 MG/1
40 TABLET ORAL DAILY
Qty: 10 TABLET | Refills: 0 | Status: SHIPPED | OUTPATIENT
Start: 2024-01-07 | End: 2024-01-12

## 2024-01-07 ASSESSMENT — PAIN SCALES - GENERAL: PAINLEVEL: EXTREME PAIN (9)

## 2024-01-07 NOTE — PROGRESS NOTES
Assessment & Plan     Persistent cough for 3 weeks or longer  Worsening symptoms for the past 3 weeks.  On exam patient is in no acute respiratory distress.  Patient is nontoxic-appearing.  Vitals are stable.  We have elected to treat for presumed bacterial bronchitis today given length and worsening of symptoms.  Zithromax is prescribed.  Tessalon Perles prescribed as needed for cough.  Prednisone is prescribed to help with bronchial irritation.  Advised to keep monitoring symptoms.  Follow-up if any worsening symptoms.  Patient agrees with the plan.  - benzonatate (TESSALON) 200 MG capsule  Dispense: 30 capsule; Refill: 1  - azithromycin (ZITHROMAX) 250 MG tablet  Dispense: 6 tablet; Refill: 0  - predniSONE (DELTASONE) 20 MG tablet  Dispense: 10 tablet; Refill: 0         Return in about 10 days (around 1/17/2024) for Symptoms failing to improve.    Jessica Rocha PA-C  [facility] [place] is a 38 year old female who presents to clinic today for the following health issues:  Chief Complaint   Patient presents with    Urgent Care     Cough x 3 weeks. Rib, back and head are in pain from coughing so hard.   At times, has urinary accidents.      HPI      URI Adult    Onset of symptoms was 3 week(s) ago.  Course of illness is worsening.    Severity moderate  Current and Associated symptoms: Cough occasionally productive, chest wall pain, headache, sinus pressure, shortness of breath with activities, wheezing  Treatment measures tried include Mucinex, Zyrtec.  Predisposing factors include ill contact: Work, Hx of allergies  Fever intermittent  Patient reports a negative home COVID test.       Review of Systems  Constitutional, HEENT, cardiovascular, pulmonary, GI, , musculoskeletal, neuro, skin, endocrine and psych systems are negative, except as otherwise noted.      Objective    /74 (BP Location: Right arm, Patient Position: Sitting, Cuff Size: Adult Regular)    "Pulse 90   Temp 98.5  F (36.9  C) (Oral)   Resp 18   Ht 1.626 m (5' 4\")   Wt 61.2 kg (135 lb)   LMP 01/04/2024 (Exact Date)   SpO2 98%   BMI 23.17 kg/m    Physical Exam   GENERAL: healthy, alert and no distress  HENT: ear canals and TM's normal, nose with boggy turbinates, mouth without ulcers or lesions, throat is moist and pink  RESP: lungs clear to auscultation - no rales, rhonchi or wheezes  CV: regular rate and rhythm, normal S1 S2  MS: no gross musculoskeletal defects noted, no edema          "

## 2024-01-07 NOTE — LETTER
January 7, 2024      Rolando Frias  [id number] [address] [id number]        To Whom It May Concern:    Rolando Frias  was seen on 1/7/2024  Please excuse her absence from work 1/7 ,1/8 due to acute medical illness.        Sincerely,        Jessica Rocha PA-C

## 2024-05-10 ENCOUNTER — HOSPITAL ENCOUNTER (EMERGENCY)
Facility: CLINIC | Age: 39
Discharge: HOME | End: 2024-05-10
Attending: EMERGENCY MEDICINE | Admitting: EMERGENCY MEDICINE
Payer: COMMERCIAL

## 2024-05-10 ENCOUNTER — TELEPHONE (OUTPATIENT)
Dept: BEHAVIORAL HEALTH | Facility: CLINIC | Age: 39
End: 2024-05-10

## 2024-05-10 ENCOUNTER — TELEPHONE (OUTPATIENT)
Dept: BEHAVIORAL HEALTH | Facility: CLINIC | Age: 39
End: 2024-05-10
Payer: COMMERCIAL

## 2024-05-10 VITALS
SYSTOLIC BLOOD PRESSURE: 134 MMHG | HEIGHT: 63 IN | RESPIRATION RATE: 18 BRPM | HEART RATE: 88 BPM | OXYGEN SATURATION: 97 % | DIASTOLIC BLOOD PRESSURE: 97 MMHG | TEMPERATURE: 98.2 F | WEIGHT: 141 LBS | BODY MASS INDEX: 24.98 KG/M2

## 2024-05-10 DIAGNOSIS — F10.920 ALCOHOLIC INTOXICATION WITHOUT COMPLICATION (H): ICD-10-CM

## 2024-05-10 DIAGNOSIS — R45.89 SUICIDAL BEHAVIOR WITHOUT ATTEMPTED SELF-INJURY: ICD-10-CM

## 2024-05-10 PROBLEM — F41.1 GENERALIZED ANXIETY DISORDER: Status: ACTIVE | Noted: 2024-05-10

## 2024-05-10 PROBLEM — F33.9 RECURRENT MAJOR DEPRESSION (H): Status: ACTIVE | Noted: 2024-05-10

## 2024-05-10 PROBLEM — F10.229 ALCOHOL DEPENDENCE WITH ACUTE ALCOHOLIC INTOXICATION (H): Status: ACTIVE | Noted: 2024-05-10

## 2024-05-10 LAB
ALBUMIN SERPL BCG-MCNC: 4.1 G/DL (ref 3.5–5.2)
ALP SERPL-CCNC: 92 U/L (ref 40–150)
ALT SERPL W P-5'-P-CCNC: 40 U/L (ref 0–50)
ANION GAP SERPL CALCULATED.3IONS-SCNC: 16 MMOL/L (ref 7–15)
AST SERPL W P-5'-P-CCNC: 42 U/L (ref 0–45)
BASOPHILS # BLD AUTO: 0 10E3/UL (ref 0–0.2)
BASOPHILS NFR BLD AUTO: 0 %
BILIRUB SERPL-MCNC: 0.2 MG/DL
BUN SERPL-MCNC: 4.3 MG/DL (ref 6–20)
CALCIUM SERPL-MCNC: 8.4 MG/DL (ref 8.6–10)
CHLORIDE SERPL-SCNC: 106 MMOL/L (ref 98–107)
CREAT SERPL-MCNC: 0.63 MG/DL (ref 0.51–0.95)
DEPRECATED HCO3 PLAS-SCNC: 18 MMOL/L (ref 22–29)
EGFRCR SERPLBLD CKD-EPI 2021: >90 ML/MIN/1.73M2
EOSINOPHIL # BLD AUTO: 0.1 10E3/UL (ref 0–0.7)
EOSINOPHIL NFR BLD AUTO: 1 %
ERYTHROCYTE [DISTWIDTH] IN BLOOD BY AUTOMATED COUNT: 14.6 % (ref 10–15)
GLUCOSE SERPL-MCNC: 94 MG/DL (ref 70–99)
HCT VFR BLD AUTO: 42.5 % (ref 35–47)
HGB BLD-MCNC: 14.4 G/DL (ref 11.7–15.7)
IMM GRANULOCYTES # BLD: 0 10E3/UL
IMM GRANULOCYTES NFR BLD: 0 %
LYMPHOCYTES # BLD AUTO: 3.6 10E3/UL (ref 0.8–5.3)
LYMPHOCYTES NFR BLD AUTO: 37 %
MCH RBC QN AUTO: 28 PG (ref 26.5–33)
MCHC RBC AUTO-ENTMCNC: 33.9 G/DL (ref 31.5–36.5)
MCV RBC AUTO: 83 FL (ref 78–100)
MONOCYTES # BLD AUTO: 0.7 10E3/UL (ref 0–1.3)
MONOCYTES NFR BLD AUTO: 8 %
NEUTROPHILS # BLD AUTO: 5.1 10E3/UL (ref 1.6–8.3)
NEUTROPHILS NFR BLD AUTO: 54 %
NRBC # BLD AUTO: 0 10E3/UL
NRBC BLD AUTO-RTO: 0 /100
PLATELET # BLD AUTO: 358 10E3/UL (ref 150–450)
POTASSIUM SERPL-SCNC: 4 MMOL/L (ref 3.4–5.3)
PROT SERPL-MCNC: 7.2 G/DL (ref 6.4–8.3)
RBC # BLD AUTO: 5.15 10E6/UL (ref 3.8–5.2)
SODIUM SERPL-SCNC: 140 MMOL/L (ref 135–145)
WBC # BLD AUTO: 9.7 10E3/UL (ref 4–11)

## 2024-05-10 PROCEDURE — 99285 EMERGENCY DEPT VISIT HI MDM: CPT

## 2024-05-10 PROCEDURE — 36415 COLL VENOUS BLD VENIPUNCTURE: CPT | Performed by: EMERGENCY MEDICINE

## 2024-05-10 PROCEDURE — 80053 COMPREHEN METABOLIC PANEL: CPT | Performed by: EMERGENCY MEDICINE

## 2024-05-10 PROCEDURE — 250N000013 HC RX MED GY IP 250 OP 250 PS 637: Performed by: EMERGENCY MEDICINE

## 2024-05-10 PROCEDURE — 85025 COMPLETE CBC W/AUTO DIFF WBC: CPT | Performed by: EMERGENCY MEDICINE

## 2024-05-10 RX ORDER — HYDROXYZINE HYDROCHLORIDE 25 MG/1
25 TABLET, FILM COATED ORAL 3 TIMES DAILY PRN
Qty: 20 TABLET | Refills: 0 | Status: SHIPPED | OUTPATIENT
Start: 2024-05-10 | End: 2024-08-07

## 2024-05-10 RX ORDER — OLANZAPINE 10 MG/2ML
10 INJECTION, POWDER, FOR SOLUTION INTRAMUSCULAR 2 TIMES DAILY PRN
Status: DISCONTINUED | OUTPATIENT
Start: 2024-05-10 | End: 2024-05-10 | Stop reason: HOSPADM

## 2024-05-10 RX ORDER — HYDROXYZINE HYDROCHLORIDE 25 MG/1
25 TABLET, FILM COATED ORAL 3 TIMES DAILY PRN
Qty: 20 TABLET | Refills: 0 | Status: SHIPPED | OUTPATIENT
Start: 2024-05-10 | End: 2024-05-10

## 2024-05-10 RX ORDER — NICOTINE 21 MG/24HR
1 PATCH, TRANSDERMAL 24 HOURS TRANSDERMAL DAILY
Status: DISCONTINUED | OUTPATIENT
Start: 2024-05-10 | End: 2024-05-10 | Stop reason: HOSPADM

## 2024-05-10 RX ADMIN — NICOTINE 1 PATCH: 14 PATCH, EXTENDED RELEASE TRANSDERMAL at 05:25

## 2024-05-10 ASSESSMENT — COLUMBIA-SUICIDE SEVERITY RATING SCALE - C-SSRS
1. SINCE LAST CONTACT, HAVE YOU WISHED YOU WERE DEAD OR WISHED YOU COULD GO TO SLEEP AND NOT WAKE UP?: NO
2. HAVE YOU ACTUALLY HAD ANY THOUGHTS OF KILLING YOURSELF?: NO
TOTAL  NUMBER OF INTERRUPTED ATTEMPTS SINCE LAST CONTACT: NO
ATTEMPT SINCE LAST CONTACT: NO
6. HAVE YOU EVER DONE ANYTHING, STARTED TO DO ANYTHING, OR PREPARED TO DO ANYTHING TO END YOUR LIFE?: NO
SUICIDE, SINCE LAST CONTACT: NO
2. HAVE YOU ACTUALLY HAD ANY THOUGHTS OF KILLING YOURSELF IN THE PAST MONTH?: NO
1. IN THE PAST MONTH, HAVE YOU WISHED YOU WERE DEAD OR WISHED YOU COULD GO TO SLEEP AND NOT WAKE UP?: NO
6. HAVE YOU EVER DONE ANYTHING, STARTED TO DO ANYTHING, OR PREPARED TO DO ANYTHING TO END YOUR LIFE?: NO
TOTAL  NUMBER OF ABORTED OR SELF INTERRUPTED ATTEMPTS SINCE LAST CONTACT: NO

## 2024-05-10 ASSESSMENT — ACTIVITIES OF DAILY LIVING (ADL)
ADLS_ACUITY_SCORE: 35
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
## 2024-05-10 NOTE — ED NOTES
IP MH Referral Acuity Rating Score (RARS)    LMHP complete at referral to IP MH, with DEC; and, daily while awaiting IP MH placement. Call score to PPS.  CRITERIA SCORING   New 72 HH and Involuntary for IP MH (not adolescent) 1/1   Boarding over 24 hours 0/0   Vulnerable adult at least 55+ with multiple co morbidities; or, Patient age 11 or under 0/1   Suicide ideation without relief of precipitating factors 1/1   Current plan for suicide 0/1   Current plan for homicide 0/1   Imminent risk or actual attempt to seriously harm another without relief of factors precipitating the attempt 1/1   Severe dysfunction in daily living (ex: complete neglect for self care, extreme disruption in vegetative function, extreme deterioration in social interactions) 1/1   Recent (last 2 weeks) or current physical aggression in the ED 0/1   Restraints or seclusion episode in ED 0/1   Verbal aggression, agitation, yelling, etc., while in the ED 0/1   Active psychosis with psychomotor agitation or catatonia 0/1   Need for constant or near constant redirection (from leaving, from others, etc).  0   Intrusive or disruptive behaviors 0/1   TOTAL Accuity Score = 4       5

## 2024-05-10 NOTE — TELEPHONE ENCOUNTER
R:  [place]  Access Inpatient Bed Call Log 5/10/2024 8:04:06 AM    Intake has called facilities that have not updated their bed status within the last 12 hours.      ADULTS:    [facility] is posting 0 beds.              [facility] is posting 0 beds. [phone] 8:21a Per Anita, beds available, go through APS- [phone].   [facility] is posting 0 beds. [phone]        [facility] is posting 0 beds. [phone]  [facility] is posting 0 beds. [phone]  [facility] is posting 0 beds. [phone]            [facility] is posting 0 beds. [phone]     [facility] is posting 2 beds. [phone]         [facility] is posting 0 beds. Mixed unit 12+. Low acuity only.  DIRECT: [phone]      [facility] is positing 2 beds. No aggression.  [phone]         [facility] [place] [facility] is posting 0 beds. [phone]   [facility] is posting 1 beds. Low acuity only. [phone]      [facility] is posting 3 beds. Low acuity. No current aggression. [phone]   [facility] is posting 0 beds. Low acuity. [phone]   [facility] is posting 0 beds. 72 HH preferred. Low acuity. [phone] 8:19a Per Kassandra, transferred to care manager. Left VM.  [facility] is posting 3 beds. Low acuity. [phone]   [facility] is posting 3 beds. Vol only, No Hx of aggression, violence, or assault. No sexual offenders. No 72 hr holds. [phone]       [facility] is posting 8 beds. (Must have the cognitive ability to do programming. No aggressive or violent behavior or recent HX in the last 2 yrs. MH must be primary.) [phone]  [facility] is posting 2 beds. Low acuity only. Violence and aggression capped. [phone]     [facility] is posting 0 beds. Low acuity, Neg Covid. [phone] 8:20a Per Shanelle, queueing people can take information.  [facility] posting 3 beds.  Negative covid.    [facility] is posting 2 beds. [phone] no wounds, lines, drains, C-paps, tubes and must be able to care for themselves; no heavy hx of aggression. Pt also needs a confirmed ride from facility upon d/c. 8:18a Per Amber, 3 beds available.  [facility] is posting 12 beds. Call for details. [phone] OUT OF STATE 8:04a Per Ainsley, 7 adol and 20 adult beds available.  [facility] is posting 3 beds. Mixed unit.  [phone] 8:12a Per PALMIRA Frances CB.       Pt remains on work list pending appropriate bed availability.
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
## 2024-05-10 NOTE — DISCHARGE INSTRUCTIONS
Substance Use Disorder Direct Access Resources    It is recommended that you abstain from all mood altering chemicals. Please contact the sober support hotline ([phone]) as needed; phones are answered 24 hours a day, 7 days a week.    To access substance use treatment you must have a comprehensive assessment completed to begin any treatment program.     If uninsured, please contact your county of residence for eligibility screen to substance use disorder evaluation and treatment:    [place] - [phone]   [place] - [phone]   [facility] [phone]   [place] - [phone]   [place]  [phone]   [place] - [phone]   [facility] [phone]   [place] - [phone]     If you have private insurance, call the customer service number on the back of your insurance card to find an in-network substance abuse use disorder assessment. The ideal provider will be a treatment facility, licensed in the [facility].     Community TREY Evaluations: Clients may call their [facility] for a full list of providers - Availability and services listed belo are subject to change, please call the provider to confirm    [facility]  [phone]  [facility] [place], [place], [address]  *Please call the above number to schedule a comprehensive assessment for determination of level of care needs. In person and virtual appointments available Mon-Fri.    [facility], [facility] [address], [address] Floor, [address], [place] [address] (next to the outpatient lab)    Phone: [phone]   Provides bridging services to people with Opiate Use Disorders (OUD) seeking care. This is a front door to Medication Assisted Treatments (MAT), ages 16+  Walk In hours: Monday-Friday 9:00am-3:00pm    [facility]  [phone]  Walk in Assessments: Mon-Friday 7a-1:45p  [address], [place], [address]    [facility]  Central Access [phone]  [address] [place] [address]  [place], [place], [address]  *by appointment only    [facility]  [phone] (phone consultation available )  Locations in: [place], [place], [facility], and [place], [place]  Arabic virtual IOP [phone] or visit [email or web address]   Also offers LGBTQ programming     [facility]  [phone]  [address] [facility]  [place], [place], [address]  *Currently only offered via telehealth - call to set up an appointment    [facility]  [address] [place], [place], [address]  [facility]  For more information to to make a referral call:  [phone]  Walk-in on   9-11 a.m.    [facility]  [phone]  [address] [place], [place], [address]  *available by appointments only    [facility]  [phone]  [address] [place], [place], [address]  *available by appointment only    [facility]  [phone]  [address] [place], [place], [address]  *walk in assessments available M-F starting at 7 am.    [facility]  [phone]  Locations: [facility], [facility], and [place]  *Walk in assessments availble M-F starting at 8 am -virtual only    [facility]  [phone]  [address] [place] [address]    [facility]  Virtual + Locations: [place], [place], [place], [place], [facility], [place], [place], [place]   [phone]  *available by appointment only    [facility]  [phone]  [address]  [place], [place], [address]    [facility]  [phone]  [address], [place], [address]  *available by appointment    [facility]  [phone]  [address] [place], [place], [address]  *walk ins available M-TH from 9-3    [facility] program: [phone]  [address]  [place], [place], [address]    [place]  [phone]  Same day substance use disorder assessments are available Monday - Friday, via walk-in or by appointment at the [place] location.  [address], [address], [place] [address]     [facility] - adolescent and adult SUDs services  [phone]  Offer services Monday through Friday, as well as evening hours Monday through Thursday. Normally, a first appointment will be scheduled within one week  [email or web address]  Locations all over [place]    If you are intoxicated, you may be required to detox at a detox facility before starting treatment. The following are detox facilities that you can self present to. All detox facilities are able to help you complete an assessment prior to discharge if you choose:    [facility]: Arrive at a [facility] for immediate medical evaluation    [facility]: [address] [place], [place], [address].         [phone]    [facility]: [address] [place], [place], [address]  [phone]     [facility]): [address] [place], [place], [address]  [phone]     [facility]: [address] [place], [place], [address], [phone]         Ways to help cope with sobriety:    -- Take prescribed medicines as scheduled  -- Keep follow-up appointments  -- Talk to others about your concerns  -- Get regular exercise  -- Practice deep breathing skills  -- Eat a healthy diet  -- Use community resources, including hotline numbers, [facility] crisis and support meetings  -- Stay sober and avoid places/people/things associated with substance use  --Maintain a daily schedule/routine  --Get at least 7-8 hours of sleep per night  --Create a list 10--20 healthy activities that you can do that are enjoyable and do not involve substance use  --Create daily goals (approx. 1-4 goals) per day and work to achieve them throughout the day.       Free  Resources:    [facility] ([facility])  [facility] connects people seeking recovery to resources that help foster and sustain long-term recovery. Whether you are seeking resources for treatment, transportation, housing, job training, education, health care or other pathways to recovery, [facility] is a great place to start.  Phone: 209.661.2936. www.[place] Logic.Sensobi (Great listing of all types of recovery and non-recovery related resources)    Alcoholics Anonymous  Phone: 9-277-ALCOHOL  Website: HTTP://WWW.AA.ORG/  [facility] [place] (973-571-5125 or http://aaOceansblue Systems.org)  [facility] [place] (280-514-4676 or www.aastpaul.org)     Narcotics Anonymous  Phone: 938.446.1439  Website: www.[place].Sharingforce.    [facility] [employer] [address], [place], [place],  Phone: [phone]  Drop-in Hours: Monday-Friday 9-11:30 am. By appointment at other times.  Provides: [facility] is a drop-in center on the [place] side [facility] that provides a safe space for individuals who are homeless and have a history of chemical use. Sobriety is not a requirement but drugs and alcohol are not allowed on the property.  Services: Non-clients can access drop-in services such as Recovery and Harm Reduction Groups, referrals to case management, community activities, shower facilities, and a pool table. Individuals who are homeless and have chemical health needs may be eligible for enrollment into [facility]'s case management program. Clients and  work together to access benefits, treatment, health care, shelter, and external housing resources.
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
## 2024-05-10 NOTE — PLAN OF CARE
RN ED Mental Health Handoff Note    Voluntary    Does patient require 1:1? No    Hold and rights been given and documented for patient: Yes    Is the patient in  scrubs? No -offered but pt refused to change into scrubs    Has the patient been searched? Yes    Is the 15 minute observation tool up to date? Yes    Was patient issued a welcome folder? Yes    Room check completed this shift: Yes    PSS3 and [place] Assessment/Reassessment this shift:    C-SSRS ([place])      Date and Time Q1 Wished to be Dead (Past Month) Q2 Suicidal Thoughts (Past Month) Q3 Suicidal Thought Method Q4 Suicidal Intent without Specific Plan Q5 Suicide Intent with Specific Plan Q6 Suicide Behavior (Lifetime) Within the Past 3 Months? Level of Risk per Screen Level of Risk per Screen User   05/10/24 0508 1-->yes 1-->yes 1-->yes -- 0-->no 1-->yes -- -- moderate risk MC   05/10/24 0122 0-->no 0-->no -- -- -- 0-->no -- -- no risks indicated EDL            Behavioral status of patient: Green    Code 21 called this shift? No    Use of restraints/seclusion this shift? No    Most recent vital signs:  Temp: 98.2  F (36.8  C) Temp src: Tympanic BP: (!) 158/103 Pulse: 104   Resp: 20 SpO2: 100 % O2 Device: None (Room air)      Medications:  Scheduled medication compliance? Yes    PRN Meds administered this shift? No    Medications   OLANZapine (zyPREXA) injection 10 mg (has no administration in time range)   nicotine (NICODERM CQ) 14 MG/24HR 24 hr patch 1 patch (1 patch Transdermal $Patch/Med Applied 5/10/24 2970)         ADLs    Meal Provided this shift? Yes    Hygiene items provided? No    ADLs completed? No    Date of last shower: PTA    Any significant events this shift? No    Any information that would be helpful in caring for this patient? None    Family present/updated? No    Location of patient's belongings: In room    Critical Care Minutes:  Does the patient need critical care minutes documented? No
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
## 2024-05-10 NOTE — PHARMACY-ADMISSION MEDICATION HISTORY
Pharmacist Admission Medication History    Admission medication history is complete. The information provided in this note is only as accurate as the sources available at the time of the update.    Information Source(s): Patient, [facility] records, [facility]    Pertinent Information: Patient stated she takes 2 zyrtec every day    Changes made to PTA medication list:  Added: None  Deleted: None  Changed: Zyrtec from 10 mg to 20 mg daily     Allergies reviewed with patient and updates made in EHR: no    Medication History Completed By: Vito Scanlon [facility] 5/10/2024 7:42 AM    PTA Med List   Medication Sig Last Dose    benzonatate (TESSALON) 200 MG capsule Take 1 capsule (200 mg) by mouth 3 times daily as needed for cough Unknown at prn    cetirizine (ZYRTEC) 10 MG tablet Take 20 mg by mouth daily 5/9/2024 at am

## 2024-05-10 NOTE — TELEPHONE ENCOUNTER
R:    [1:36 PM] Devika Simms    MRN: [id number] LR, adult, please remove from the work list, patient is discharging with outpatient resources     Intake notes pt removed from active placement [facility]. Intake will no longer follow for IP MH admission.

## 2024-07-19 ENCOUNTER — OFFICE VISIT (OUTPATIENT)
Dept: URGENT CARE | Facility: URGENT CARE | Age: 39
End: 2024-07-19
Payer: COMMERCIAL

## 2024-07-19 VITALS
RESPIRATION RATE: 20 BRPM | TEMPERATURE: 97.5 F | DIASTOLIC BLOOD PRESSURE: 87 MMHG | BODY MASS INDEX: 24.98 KG/M2 | WEIGHT: 141 LBS | HEART RATE: 96 BPM | SYSTOLIC BLOOD PRESSURE: 125 MMHG | OXYGEN SATURATION: 98 %

## 2024-07-19 DIAGNOSIS — J02.9 SORE THROAT: ICD-10-CM

## 2024-07-19 DIAGNOSIS — L50.9 HIVES: ICD-10-CM

## 2024-07-19 DIAGNOSIS — R11.0 NAUSEA: ICD-10-CM

## 2024-07-19 DIAGNOSIS — F41.9 ANXIETY: Primary | ICD-10-CM

## 2024-07-19 LAB
DEPRECATED S PYO AG THROAT QL EIA: NEGATIVE
GROUP A STREP BY PCR: NOT DETECTED

## 2024-07-19 PROCEDURE — 87651 STREP A DNA AMP PROBE: CPT | Performed by: FAMILY MEDICINE

## 2024-07-19 PROCEDURE — 99204 OFFICE O/P NEW MOD 45 MIN: CPT | Performed by: FAMILY MEDICINE

## 2024-07-19 RX ORDER — LORAZEPAM 0.5 MG/1
0.5 TABLET ORAL EVERY 6 HOURS PRN
Qty: 6 TABLET | Refills: 0 | Status: SHIPPED | OUTPATIENT
Start: 2024-07-19 | End: 2024-08-07

## 2024-07-19 RX ORDER — ONDANSETRON 4 MG/1
4 TABLET, ORALLY DISINTEGRATING ORAL EVERY 8 HOURS PRN
Qty: 12 TABLET | Refills: 0 | Status: SHIPPED | OUTPATIENT
Start: 2024-07-19

## 2024-07-19 RX ORDER — PREDNISONE 20 MG/1
40 TABLET ORAL DAILY
Qty: 10 TABLET | Refills: 0 | Status: SHIPPED | OUTPATIENT
Start: 2024-07-19 | End: 2024-07-24

## 2024-07-19 RX ORDER — HYDROXYZINE PAMOATE 25 MG/1
25 CAPSULE ORAL 3 TIMES DAILY PRN
Qty: 30 CAPSULE | Refills: 0 | Status: CANCELLED | OUTPATIENT
Start: 2024-07-19

## 2024-07-19 RX ORDER — CETIRIZINE HYDROCHLORIDE 10 MG/1
10-20 TABLET ORAL DAILY
Qty: 60 TABLET | Refills: 0 | Status: SHIPPED | OUTPATIENT
Start: 2024-07-19 | End: 2024-08-07

## 2024-07-19 ASSESSMENT — PAIN SCALES - GENERAL: PAINLEVEL: NO PAIN (0)
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
## 2024-07-19 NOTE — LETTER
July 19, 2024      Rolando Frias  [address] [address] [facility] [address]        To Whom It May Concern:    Rolando Frias  was seen on 7/19.  Please excuse her from work 7/19-7/20 due to illness.        Sincerely,        Adrian Carey MD

## 2024-07-19 NOTE — PATIENT INSTRUCTIONS
Continue with zyrtec 10 mg - up to 2 tablets (20 mg) daily for next several weeks  Take full course of prednisone burst for hives    Okay to take ativan for panic attack, use sparingly.  This is a controlled based medication and further need will be thru primary provider    Okay to take zofran to help with nausea symptoms  We will contact you if the strep PCR culture is positive

## 2024-08-07 ENCOUNTER — OFFICE VISIT (OUTPATIENT)
Dept: FAMILY MEDICINE | Facility: CLINIC | Age: 39
End: 2024-08-07
Payer: COMMERCIAL

## 2024-08-07 VITALS
SYSTOLIC BLOOD PRESSURE: 110 MMHG | DIASTOLIC BLOOD PRESSURE: 78 MMHG | BODY MASS INDEX: 23.57 KG/M2 | HEIGHT: 63 IN | HEART RATE: 101 BPM | OXYGEN SATURATION: 99 % | TEMPERATURE: 99 F | RESPIRATION RATE: 16 BRPM | WEIGHT: 133 LBS

## 2024-08-07 DIAGNOSIS — F43.22 ACUTE ADJUSTMENT DISORDER WITH ANXIETY: Primary | ICD-10-CM

## 2024-08-07 DIAGNOSIS — R45.1 AGITATION: ICD-10-CM

## 2024-08-07 PROCEDURE — 99213 OFFICE O/P EST LOW 20 MIN: CPT | Performed by: FAMILY MEDICINE

## 2024-08-07 PROCEDURE — 96127 BRIEF EMOTIONAL/BEHAV ASSMT: CPT | Performed by: FAMILY MEDICINE

## 2024-08-07 PROCEDURE — G2211 COMPLEX E/M VISIT ADD ON: HCPCS | Performed by: FAMILY MEDICINE

## 2024-08-07 RX ORDER — HYDROXYZINE HYDROCHLORIDE 10 MG/1
10 TABLET, FILM COATED ORAL 3 TIMES DAILY PRN
Qty: 30 TABLET | Refills: 1 | Status: SHIPPED | OUTPATIENT
Start: 2024-08-07 | End: 2024-09-05

## 2024-08-07 RX ORDER — ESCITALOPRAM OXALATE 10 MG/1
10 TABLET ORAL DAILY
Qty: 30 TABLET | Refills: 1 | Status: SHIPPED | OUTPATIENT
Start: 2024-08-07 | End: 2024-10-02

## 2024-08-07 RX ORDER — ARIPIPRAZOLE 5 MG/1
5 TABLET ORAL AT BEDTIME
Qty: 30 TABLET | Refills: 1 | Status: SHIPPED | OUTPATIENT
Start: 2024-08-07 | End: 2024-10-02

## 2024-08-07 ASSESSMENT — ANXIETY QUESTIONNAIRES
GAD7 TOTAL SCORE: 21
GAD7 TOTAL SCORE: 21
5. BEING SO RESTLESS THAT IT IS HARD TO SIT STILL: NEARLY EVERY DAY
2. NOT BEING ABLE TO STOP OR CONTROL WORRYING: NEARLY EVERY DAY
8. IF YOU CHECKED OFF ANY PROBLEMS, HOW DIFFICULT HAVE THESE MADE IT FOR YOU TO DO YOUR WORK, TAKE CARE OF THINGS AT HOME, OR GET ALONG WITH OTHER PEOPLE?: EXTREMELY DIFFICULT
GAD7 TOTAL SCORE: 21
3. WORRYING TOO MUCH ABOUT DIFFERENT THINGS: NEARLY EVERY DAY
IF YOU CHECKED OFF ANY PROBLEMS ON THIS QUESTIONNAIRE, HOW DIFFICULT HAVE THESE PROBLEMS MADE IT FOR YOU TO DO YOUR WORK, TAKE CARE OF THINGS AT HOME, OR GET ALONG WITH OTHER PEOPLE: EXTREMELY DIFFICULT
1. FEELING NERVOUS, ANXIOUS, OR ON EDGE: NEARLY EVERY DAY
7. FEELING AFRAID AS IF SOMETHING AWFUL MIGHT HAPPEN: NEARLY EVERY DAY
4. TROUBLE RELAXING: NEARLY EVERY DAY
6. BECOMING EASILY ANNOYED OR IRRITABLE: NEARLY EVERY DAY
7. FEELING AFRAID AS IF SOMETHING AWFUL MIGHT HAPPEN: NEARLY EVERY DAY

## 2024-08-07 ASSESSMENT — PATIENT HEALTH QUESTIONNAIRE - PHQ9
SUM OF ALL RESPONSES TO PHQ QUESTIONS 1-9: 25
10. IF YOU CHECKED OFF ANY PROBLEMS, HOW DIFFICULT HAVE THESE PROBLEMS MADE IT FOR YOU TO DO YOUR WORK, TAKE CARE OF THINGS AT HOME, OR GET ALONG WITH OTHER PEOPLE: EXTREMELY DIFFICULT
SUM OF ALL RESPONSES TO PHQ QUESTIONS 1-9: 25

## 2024-08-07 ASSESSMENT — PAIN SCALES - GENERAL: PAINLEVEL: NO PAIN (0)
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
## 2024-09-05 DIAGNOSIS — R45.1 AGITATION: ICD-10-CM

## 2024-09-05 RX ORDER — HYDROXYZINE HYDROCHLORIDE 10 MG/1
TABLET, FILM COATED ORAL
Qty: 30 TABLET | Refills: 1 | Status: SHIPPED | OUTPATIENT
Start: 2024-09-05 | End: 2024-10-03

## 2024-10-02 DIAGNOSIS — R45.1 AGITATION: ICD-10-CM

## 2024-10-02 DIAGNOSIS — F43.22 ACUTE ADJUSTMENT DISORDER WITH ANXIETY: ICD-10-CM

## 2024-10-02 RX ORDER — ESCITALOPRAM OXALATE 10 MG/1
10 TABLET ORAL DAILY
Qty: 7 TABLET | Refills: 0 | Status: SHIPPED | OUTPATIENT
Start: 2024-10-02 | End: 2024-10-03

## 2024-10-02 RX ORDER — ARIPIPRAZOLE 5 MG/1
5 TABLET ORAL AT BEDTIME
Qty: 7 TABLET | Refills: 0 | Status: SHIPPED | OUTPATIENT
Start: 2024-10-02 | End: 2024-10-03

## 2024-10-03 ENCOUNTER — VIRTUAL VISIT (OUTPATIENT)
Dept: FAMILY MEDICINE | Facility: CLINIC | Age: 39
End: 2024-10-03
Payer: COMMERCIAL

## 2024-10-03 DIAGNOSIS — F43.22 ACUTE ADJUSTMENT DISORDER WITH ANXIETY: Primary | ICD-10-CM

## 2024-10-03 DIAGNOSIS — R45.1 AGITATION: ICD-10-CM

## 2024-10-03 PROCEDURE — 99213 OFFICE O/P EST LOW 20 MIN: CPT | Mod: 95 | Performed by: FAMILY MEDICINE

## 2024-10-03 PROCEDURE — 96127 BRIEF EMOTIONAL/BEHAV ASSMT: CPT | Mod: 95 | Performed by: FAMILY MEDICINE

## 2024-10-03 RX ORDER — ESCITALOPRAM OXALATE 20 MG/1
20 TABLET ORAL DAILY
Qty: 90 TABLET | Refills: 1 | Status: SHIPPED | OUTPATIENT
Start: 2024-10-03

## 2024-10-03 RX ORDER — COPPER 313.4 MG/1
1 INTRAUTERINE DEVICE INTRAUTERINE ONCE
COMMUNITY

## 2024-10-03 RX ORDER — HYDROXYZINE HYDROCHLORIDE 10 MG/1
10 TABLET, FILM COATED ORAL EVERY 8 HOURS PRN
Qty: 90 TABLET | Refills: 5 | Status: SHIPPED | OUTPATIENT
Start: 2024-10-03

## 2024-10-03 RX ORDER — ARIPIPRAZOLE 5 MG/1
5 TABLET ORAL AT BEDTIME
Qty: 90 TABLET | Refills: 1 | Status: SHIPPED | OUTPATIENT
Start: 2024-10-03

## 2024-10-03 ASSESSMENT — ANXIETY QUESTIONNAIRES
4. TROUBLE RELAXING: NEARLY EVERY DAY
2. NOT BEING ABLE TO STOP OR CONTROL WORRYING: NEARLY EVERY DAY
1. FEELING NERVOUS, ANXIOUS, OR ON EDGE: MORE THAN HALF THE DAYS
GAD7 TOTAL SCORE: 19
8. IF YOU CHECKED OFF ANY PROBLEMS, HOW DIFFICULT HAVE THESE MADE IT FOR YOU TO DO YOUR WORK, TAKE CARE OF THINGS AT HOME, OR GET ALONG WITH OTHER PEOPLE?: EXTREMELY DIFFICULT
5. BEING SO RESTLESS THAT IT IS HARD TO SIT STILL: NEARLY EVERY DAY
7. FEELING AFRAID AS IF SOMETHING AWFUL MIGHT HAPPEN: MORE THAN HALF THE DAYS
IF YOU CHECKED OFF ANY PROBLEMS ON THIS QUESTIONNAIRE, HOW DIFFICULT HAVE THESE PROBLEMS MADE IT FOR YOU TO DO YOUR WORK, TAKE CARE OF THINGS AT HOME, OR GET ALONG WITH OTHER PEOPLE: EXTREMELY DIFFICULT
GAD7 TOTAL SCORE: 19
3. WORRYING TOO MUCH ABOUT DIFFERENT THINGS: NEARLY EVERY DAY
GAD7 TOTAL SCORE: 19
7. FEELING AFRAID AS IF SOMETHING AWFUL MIGHT HAPPEN: MORE THAN HALF THE DAYS
6. BECOMING EASILY ANNOYED OR IRRITABLE: NEARLY EVERY DAY

## 2024-10-03 ASSESSMENT — PATIENT HEALTH QUESTIONNAIRE - PHQ9
10. IF YOU CHECKED OFF ANY PROBLEMS, HOW DIFFICULT HAVE THESE PROBLEMS MADE IT FOR YOU TO DO YOUR WORK, TAKE CARE OF THINGS AT HOME, OR GET ALONG WITH OTHER PEOPLE: EXTREMELY DIFFICULT
SUM OF ALL RESPONSES TO PHQ QUESTIONS 1-9: 18
SUM OF ALL RESPONSES TO PHQ QUESTIONS 1-9: 18

## 2024-10-03 NOTE — PROGRESS NOTES
Rolando is a 38 year old who is being evaluated via a billable video visit.    How would you like to obtain your AVS? MyChart  If the video visit is dropped, the invitation should be resent by: Text to cell phone: [phone]  Will anyone else be joining your video visit? No      Assessment & Plan     Acute adjustment disorder with anxiety  Interval improvement, however still uncontrolled increase Lexapro from 10 to 20 mg daily.  Follow-up in 3 to 6 months for recheck.  If having active suicidal ideations, recommend going to the ER.  - escitalopram (LEXAPRO) 20 MG tablet  Dispense: 90 tablet; Refill: 1  - EMOTIONAL / BEHAVIORAL ASSESSMENT    Agitation  Continue current medical management.  - ARIPiprazole (ABILIFY) 5 MG tablet  Dispense: 90 tablet; Refill: 1  - hydrOXYzine HCl (ATARAX) 10 MG tablet  Dispense: 90 tablet; Refill: 5            Depression Screening Follow Up        10/3/2024     1:46 PM   PHQ   PHQ-9 Total Score 18   Q9: Thoughts of better off dead/self-harm past 2 weeks Several days   F/U: Thoughts of suicide or self-harm No   F/U: Safety concerns No     Follow Up Actions Taken  Patient declined referral.    Discussed the following ways the patient can remain in a safe environment:  be around others        Subjective   Rolando is a 38 year old, presenting for the following health issues:  RECHECK (Follow up anxiety)      10/3/2024     1:54 PM   Additional Questions   Roomed by Rajan   Accompanied by self     History of Present Illness       Mental Health Follow-up:  Patient presents to follow-up on Depression & Anxiety.Patient's depression since last visit has been:  Medium  The patient is having other symptoms associated with depression.  Patient's anxiety since last visit has been:  Medium  The patient is having other symptoms associated with anxiety.  Any significant life events: relationship concerns, financial concerns and grief or loss  Patient is feeling anxious or having panic  attacks.  Patient has no concerns about alcohol or drug use.    She eats 0-1 servings of fruits and vegetables daily.She consumes 6 sweetened beverage(s) daily.She exercises with enough effort to increase her heart rate 9 or less minutes per day.  She exercises with enough effort to increase her heart rate 3 or less days per week. She is missing 2 dose(s) of medications per week.  She is not taking prescribed medications regularly due to remembering to take.         8/7/2024     1:13 PM 10/3/2024     1:46 PM   PHQ   PHQ-9 Total Score 25 18   Q9: Thoughts of better off dead/self-harm past 2 weeks Several days Several days   F/U: Thoughts of suicide or self-harm No No   F/U: Safety concerns No No         8/7/2024     1:14 PM 10/3/2024     1:48 PM   PADMINI-7 SCORE   Total Score 21 (severe anxiety) 19 (severe anxiety)   Total Score 21 19       Patient presents for interval follow-up for acute adjustment disorder with anxiety and depression.  She does find the medication helpful, has been using hydroxyzine 3 times daily as well.  Continues her Abilify which has helped with her agitation and Lexapro daily.  No reported side effects in the interim from her last visit, she had a death in the family which has triggered her mental health for decline but she is overall doing okay.      Objective           Vitals:  No vitals were obtained today due to virtual visit.    Physical Exam   GENERAL: alert and no distress  EYES: Eyes grossly normal to inspection.  No discharge or erythema, or obvious scleral/conjunctival abnormalities.  RESP: No audible wheeze, cough, or visible cyanosis.    SKIN: Visible skin clear. No significant rash, abnormal pigmentation or lesions.  NEURO: Cranial nerves grossly intact.  Mentation and speech appropriate for age.  PSYCH: Appropriate affect, tone, and pace of words          Video-Visit Details    Type of service:  Video Visit   Originating Location (pt. Location): Home    Distant Location (provider  location):  On-site  Platform used for Video Visit: Arianna  Signed Electronically by: Walter Hussein MD

## 2024-12-14 ENCOUNTER — HEALTH MAINTENANCE LETTER (OUTPATIENT)
Age: 39
End: 2024-12-14

## 2025-03-11 ENCOUNTER — TELEPHONE (OUTPATIENT)
Dept: FAMILY MEDICINE | Facility: CLINIC | Age: 40
End: 2025-03-11
Payer: COMMERCIAL

## 2025-03-11 NOTE — TELEPHONE ENCOUNTER
Patient Quality Outreach    Patient is due for the following:   Cervical Cancer Screening - PAP Needed  Physical Annual Wellness Visit    Action(s) Taken:   Schedule a Adult Preventative    Type of outreach:    Sent [facility] ([facility] [employer]) message.    Questions for provider review:    None           Rashida Leiva CMA